# Patient Record
Sex: MALE | Race: WHITE | NOT HISPANIC OR LATINO | Employment: OTHER | URBAN - METROPOLITAN AREA
[De-identification: names, ages, dates, MRNs, and addresses within clinical notes are randomized per-mention and may not be internally consistent; named-entity substitution may affect disease eponyms.]

---

## 2023-01-19 ENCOUNTER — OFFICE VISIT (OUTPATIENT)
Dept: FAMILY MEDICINE CLINIC | Facility: CLINIC | Age: 43
End: 2023-01-19
Payer: MEDICAID

## 2023-01-19 ENCOUNTER — LAB (OUTPATIENT)
Dept: LAB | Facility: HOSPITAL | Age: 43
End: 2023-01-19
Payer: MEDICAID

## 2023-01-19 ENCOUNTER — PATIENT ROUNDING (BHMG ONLY) (OUTPATIENT)
Dept: FAMILY MEDICINE CLINIC | Facility: CLINIC | Age: 43
End: 2023-01-19
Payer: MEDICAID

## 2023-01-19 VITALS
HEIGHT: 73 IN | OXYGEN SATURATION: 98 % | WEIGHT: 284 LBS | HEART RATE: 68 BPM | DIASTOLIC BLOOD PRESSURE: 76 MMHG | SYSTOLIC BLOOD PRESSURE: 126 MMHG | BODY MASS INDEX: 37.64 KG/M2

## 2023-01-19 DIAGNOSIS — Z00.00 PREVENTATIVE HEALTH CARE: ICD-10-CM

## 2023-01-19 DIAGNOSIS — F22 PARANOIA: ICD-10-CM

## 2023-01-19 DIAGNOSIS — Z00.00 PREVENTATIVE HEALTH CARE: Primary | ICD-10-CM

## 2023-01-19 DIAGNOSIS — Z11.59 ENCOUNTER FOR HEPATITIS C SCREENING TEST FOR LOW RISK PATIENT: ICD-10-CM

## 2023-01-19 DIAGNOSIS — K76.0 FATTY LIVER: ICD-10-CM

## 2023-01-19 DIAGNOSIS — E55.9 VITAMIN D DEFICIENCY: ICD-10-CM

## 2023-01-19 DIAGNOSIS — K57.90 DIVERTICULOSIS: ICD-10-CM

## 2023-01-19 LAB
25(OH)D3 SERPL-MCNC: 46.1 NG/ML (ref 30–100)
ALBUMIN SERPL-MCNC: 4.8 G/DL (ref 3.5–5.2)
ALBUMIN/GLOB SERPL: 2.2 G/DL
ALP SERPL-CCNC: 56 U/L (ref 39–117)
ALT SERPL W P-5'-P-CCNC: 26 U/L (ref 1–41)
ANION GAP SERPL CALCULATED.3IONS-SCNC: 8.5 MMOL/L (ref 5–15)
AST SERPL-CCNC: 21 U/L (ref 1–40)
BACTERIA UR QL AUTO: NORMAL /HPF
BASOPHILS # BLD AUTO: 0.04 10*3/MM3 (ref 0–0.2)
BASOPHILS NFR BLD AUTO: 0.6 % (ref 0–1.5)
BILIRUB SERPL-MCNC: 0.2 MG/DL (ref 0–1.2)
BILIRUB UR QL STRIP: NEGATIVE
BUN SERPL-MCNC: 22 MG/DL (ref 6–20)
BUN/CREAT SERPL: 22.7 (ref 7–25)
CALCIUM SPEC-SCNC: 9.3 MG/DL (ref 8.6–10.5)
CHLORIDE SERPL-SCNC: 102 MMOL/L (ref 98–107)
CHOLEST SERPL-MCNC: 193 MG/DL (ref 0–200)
CLARITY UR: CLEAR
CO2 SERPL-SCNC: 25.5 MMOL/L (ref 22–29)
COLOR UR: YELLOW
CREAT SERPL-MCNC: 0.97 MG/DL (ref 0.76–1.27)
DEPRECATED RDW RBC AUTO: 40.5 FL (ref 37–54)
EGFRCR SERPLBLD CKD-EPI 2021: 98.7 ML/MIN/1.73
EOSINOPHIL # BLD AUTO: 0.36 10*3/MM3 (ref 0–0.4)
EOSINOPHIL NFR BLD AUTO: 5.4 % (ref 0.3–6.2)
ERYTHROCYTE [DISTWIDTH] IN BLOOD BY AUTOMATED COUNT: 11.3 % (ref 12.3–15.4)
GLOBULIN UR ELPH-MCNC: 2.2 GM/DL
GLUCOSE SERPL-MCNC: 94 MG/DL (ref 65–99)
GLUCOSE UR STRIP-MCNC: NEGATIVE MG/DL
HBA1C MFR BLD: 5.3 % (ref 4.8–5.6)
HCT VFR BLD AUTO: 38.5 % (ref 37.5–51)
HCV AB SER DONR QL: NORMAL
HDLC SERPL-MCNC: 52 MG/DL (ref 40–60)
HGB BLD-MCNC: 13.6 G/DL (ref 13–17.7)
HGB UR QL STRIP.AUTO: NEGATIVE
HYALINE CASTS UR QL AUTO: NORMAL /LPF
IMM GRANULOCYTES # BLD AUTO: 0.01 10*3/MM3 (ref 0–0.05)
IMM GRANULOCYTES NFR BLD AUTO: 0.2 % (ref 0–0.5)
KETONES UR QL STRIP: NEGATIVE
LDLC SERPL CALC-MCNC: 114 MG/DL (ref 0–100)
LDLC/HDLC SERPL: 2.12 {RATIO}
LEUKOCYTE ESTERASE UR QL STRIP.AUTO: ABNORMAL
LYMPHOCYTES # BLD AUTO: 1.56 10*3/MM3 (ref 0.7–3.1)
LYMPHOCYTES NFR BLD AUTO: 23.6 % (ref 19.6–45.3)
MCH RBC QN AUTO: 34.8 PG (ref 26.6–33)
MCHC RBC AUTO-ENTMCNC: 35.3 G/DL (ref 31.5–35.7)
MCV RBC AUTO: 98.5 FL (ref 79–97)
MONOCYTES # BLD AUTO: 0.88 10*3/MM3 (ref 0.1–0.9)
MONOCYTES NFR BLD AUTO: 13.3 % (ref 5–12)
NEUTROPHILS NFR BLD AUTO: 3.77 10*3/MM3 (ref 1.7–7)
NEUTROPHILS NFR BLD AUTO: 56.9 % (ref 42.7–76)
NITRITE UR QL STRIP: NEGATIVE
NRBC BLD AUTO-RTO: 0 /100 WBC (ref 0–0.2)
PH UR STRIP.AUTO: 6.5 [PH] (ref 5–8)
PLATELET # BLD AUTO: 314 10*3/MM3 (ref 140–450)
PMV BLD AUTO: 9.1 FL (ref 6–12)
POTASSIUM SERPL-SCNC: 4.5 MMOL/L (ref 3.5–5.2)
PROT SERPL-MCNC: 7 G/DL (ref 6–8.5)
PROT UR QL STRIP: NEGATIVE
RBC # BLD AUTO: 3.91 10*6/MM3 (ref 4.14–5.8)
RBC # UR STRIP: NORMAL /HPF
REF LAB TEST METHOD: NORMAL
SODIUM SERPL-SCNC: 136 MMOL/L (ref 136–145)
SP GR UR STRIP: 1.01 (ref 1–1.03)
SQUAMOUS #/AREA URNS HPF: NORMAL /HPF
T4 FREE SERPL-MCNC: 0.9 NG/DL (ref 0.93–1.7)
TRIGL SERPL-MCNC: 155 MG/DL (ref 0–150)
TSH SERPL DL<=0.05 MIU/L-ACNC: 1.02 UIU/ML (ref 0.27–4.2)
UROBILINOGEN UR QL STRIP: ABNORMAL
VLDLC SERPL-MCNC: 27 MG/DL (ref 5–40)
WBC # UR STRIP: NORMAL /HPF
WBC NRBC COR # BLD: 6.62 10*3/MM3 (ref 3.4–10.8)

## 2023-01-19 PROCEDURE — 99386 PREV VISIT NEW AGE 40-64: CPT | Performed by: INTERNAL MEDICINE

## 2023-01-19 PROCEDURE — 81001 URINALYSIS AUTO W/SCOPE: CPT

## 2023-01-19 PROCEDURE — 80061 LIPID PANEL: CPT

## 2023-01-19 PROCEDURE — 83036 HEMOGLOBIN GLYCOSYLATED A1C: CPT

## 2023-01-19 PROCEDURE — 84439 ASSAY OF FREE THYROXINE: CPT

## 2023-01-19 PROCEDURE — 80050 GENERAL HEALTH PANEL: CPT

## 2023-01-19 PROCEDURE — 3008F BODY MASS INDEX DOCD: CPT | Performed by: INTERNAL MEDICINE

## 2023-01-19 PROCEDURE — 86803 HEPATITIS C AB TEST: CPT

## 2023-01-19 PROCEDURE — 82306 VITAMIN D 25 HYDROXY: CPT

## 2023-01-19 RX ORDER — ZIPRASIDONE HYDROCHLORIDE 20 MG/1
20 CAPSULE ORAL 2 TIMES DAILY
COMMUNITY
Start: 2023-01-09

## 2023-01-19 NOTE — PROGRESS NOTES
Chief Complaint   Patient presents with   • Establish Care              HPI:  Wade Savage is a 42 y.o. male who presents today for establish care and annual checkup.    ROS:  Constitutional: no fevers, night sweats or unexplained weight loss  Eyes: no vision changes  ENT: no runny nose, ear pain, sore throat  Cardio: no chest pain, palpitations  Pulm: no shortness of breath, wheezing, or cough  GI: no abdominal pain or changes in bowel movements  : no difficulty urinating  MSK: no difficulty ambulating, no joint pain  Neuro: no weakness, dizziness or headache  Psych: no trouble sleeping  Endo: no change in appetite      Past Medical History:   Diagnosis Date   • Asthma     Childhood asthma   • Diverticulitis    • Fatty liver    • Obesity       Family History   Problem Relation Age of Onset   • Cancer Mother    • Cancer Father       Social History     Socioeconomic History   • Marital status: Single   Tobacco Use   • Smoking status: Former     Packs/day: 0.50     Years: 15.00     Pack years: 7.50     Types: Cigarettes     Start date: 1999     Quit date: 2014     Years since quittin.0   • Smokeless tobacco: Never   • Tobacco comments:     I quit smoking almost 6 years ago.   Substance and Sexual Activity   • Alcohol use: Yes     Alcohol/week: 15.0 standard drinks     Types: 15 Drinks containing 0.5 oz of alcohol per week   • Drug use: Never   • Sexual activity: Not Currently     Partners: Female     Birth control/protection: Condom      No Known Allergies   Immunization History   Administered Date(s) Administered   • COVID-19 (PFIZER) BIVALENT BOOSTER 12+YRS 2022   • COVID-19 (PFIZER) PURPLE CAP 10/13/2021        PE:  Vitals:    23 0940   BP: 126/76   Pulse: 68   SpO2: 98%      Body mass index is 37.47 kg/m².    Gen Appearance: NAD  HEENT: Normocephalic, PERRLA, no thyromegaly, trache midline  Heart: RRR, normal S1 and S2, no murmur  Lungs: CTA b/l, no wheezing, no crackles  Abdomen: Soft,  non-tender, non-distended, no guarding and BSx4  MSK: Moves all extremities well, normal gait, no peripheral edema  Pulses: Palpable and equal b/l  Lymph nodes: No palpable lymphadenopathy   Neuro: No focal deficits      Current Outpatient Medications   Medication Sig Dispense Refill   • ziprasidone (GEODON) 20 MG capsule Take 20 mg by mouth 2 (Two) Times a Day.       No current facility-administered medications for this visit.        Diagnoses and all orders for this visit:    1. Preventative health care (Primary)  -     CBC & Differential; Future  -     Comprehensive Metabolic Panel; Future  -     Hemoglobin A1c; Future  -     Lipid Panel; Future  -     TSH+Free T4; Future  -     Urinalysis With Culture If Indicated - Urine, Clean Catch; Future  Counseled on healthy weight, nutrition, physical activity, cancer screening, and immunizations.    2. Vitamin D deficiency  -     Vitamin D,25-Hydroxy; Future    3. Diverticulosis    4. Fatty liver    5. Encounter for hepatitis C screening test for low risk patient  -     Hepatitis C Antibody; Future    6. Paranoia (HCC)  Stable on geodon. Following with psychiatry.        No follow-ups on file.     Dictated Utilizing Dragon Dictation    Please note that portions of this note were completed with a voice recognition program.    Part of this note may be an electronic transcription/translation of spoken language to printed text using the Dragon Dictation System.

## 2023-01-20 ENCOUNTER — PATIENT MESSAGE (OUTPATIENT)
Dept: FAMILY MEDICINE CLINIC | Facility: CLINIC | Age: 43
End: 2023-01-20
Payer: MEDICAID

## 2023-02-01 ENCOUNTER — PATIENT MESSAGE (OUTPATIENT)
Dept: FAMILY MEDICINE CLINIC | Facility: CLINIC | Age: 43
End: 2023-02-01
Payer: MEDICAID

## 2023-02-02 DIAGNOSIS — G47.33 OSA (OBSTRUCTIVE SLEEP APNEA): Primary | ICD-10-CM

## 2023-02-03 ENCOUNTER — TELEPHONE (OUTPATIENT)
Dept: FAMILY MEDICINE CLINIC | Facility: CLINIC | Age: 43
End: 2023-02-03
Payer: MEDICAID

## 2023-02-03 NOTE — TELEPHONE ENCOUNTER
Caller: Wade Savage    Relationship: Self    Best call back number: [phone]    What orders are you requesting (i.e. lab or imaging): PNEUMONIA VACCINE    In what timeframe would the patient need to come in: NA    Where will you receive your lab/imaging services: AT OFFICE    Additional notes:

## 2023-02-09 NOTE — PROGRESS NOTES
Chief Complaint  Sleep Apnea (NEW PATIENT )    Subjective     History of Present Illness:  Wade Savage is a 42 y.o. male with a history of fatty liver, asthma, and obesity.  He has a history of JOSE and previously was followed at [facility].  Patient goes to bed at 11 PM waking at 7 AM on weekdays, and 12 AM waking at 8 AM on weekends approximately.  Takes him less than 30 minutes to get to sleep.  He does not take naps.  He denies use of tobacco, drinks alcohol 3 or 4 times per week, and denies use of illicit drugs.  Patient drinks regular coffee and regular tea.  He drinks occasional soda. He comes to [facility] care. He had a recall machine and just received a new machine.     Further details are as follows:    [place] Scale is (out of 24): Total score: 4     Estimated average amount of sleep per night: 6.5  Number of times he wakes up at night: 1  Difficulty falling back asleep: no  It usually takes 30 minutes to go to sleep.  He feels sleepy upon waking up: no  Rotating or night shift work: no    Drowsiness/Sleepiness:  He exhibits the following:  Fatigue during the end of the week.     Snoring/Breathing:  He exhibits the following:  loud snoring, snores in all sleep positions and before cpap    Head Injury:  He exhibits the following:  No    Reflux:  He describes the following:  None    Narcolepsy:  He exhibits the following:  none    RLS/PLMs:  He describes the following:  none    Insomnia:  He describes the following:  Not while on cpap    Parasomnia:  He exhibits the following:  Grinds teeth    Weight:       02/10/23  1108   Weight: 127 kg (281 lb)      Weight change in the last year:  gain: 20 lbs    The patient's relevant past medical, surgical, family, and social history reviewed and updated in Epic as appropriate.    Review of Systems   Constitutional: Negative.    HENT: Negative.    Eyes: Negative.    Respiratory: Negative.    Cardiovascular: Negative.    Gastrointestinal: Negative.    Endocrine:  "Negative.    Genitourinary: Negative.    Musculoskeletal: Negative.    Skin: Negative.    Allergic/Immunologic: Negative.    Neurological: Negative.    Hematological: Negative.    Psychiatric/Behavioral: Negative.    All other systems reviewed and are negative.      PMH:    Past Medical History:   Diagnosis Date   • Asthma     Childhood asthma   • Diverticulitis    • Fatty liver    • Obesity      Past Surgical History:   Procedure Laterality Date   • COLONOSCOPY  2013       No Known Allergies    MEDS:  Prior to Admission medications    Medication Sig Start Date End Date Taking? Authorizing Provider   ziprasidone (GEODON) 20 MG capsule Take 20 mg by mouth 2 (Two) Times a Day. 1/9/23   ProviderDel MD       FH:  Family History   Problem Relation Age of Onset   • Cancer Mother    • Cancer Father        Objective   Vital Signs:  /72   Pulse 65   Temp 97.2 °F (36.2 °C) (Infrared)   Ht 185.4 cm (73\")   Wt 127 kg (281 lb)   SpO2 99% Comment: RESTING ROOM AIR  BMI 37.07 kg/m²     Class 2 Severe Obesity (BMI >=35 and <=39.9). Obesity-related health conditions include the following: obstructive sleep apnea. Obesity is improving with lifestyle modifications. BMI is is above average; BMI management plan is completed. We discussed portion control and increasing exercise.  The patient reports that he had been working on weight loss with a goal of retesting for sleep apnea.  He gained some weight back and is now back working on his weight.        Physical Exam  Vitals reviewed.   Constitutional:       Appearance: Normal appearance.   HENT:      Head: Normocephalic and atraumatic.      Nose: Nose normal.      Mouth/Throat:      Mouth: Mucous membranes are moist.   Cardiovascular:      Rate and Rhythm: Normal rate and regular rhythm.      Heart sounds: No murmur heard.    No friction rub. No gallop.   Pulmonary:      Effort: Pulmonary effort is normal. No respiratory distress.      Breath sounds: Normal breath " sounds. No wheezing or rhonchi.   Neurological:      Mental Status: He is alert and oriented to person, place, and time.   Psychiatric:         Behavior: Behavior normal.         Mallampati Score: III (soft and hard palate and base of uvula visible)    Result Review :  The following data was reviewed by: LORNA Nieves on 02/10/2023:    Data reviewed: Compliance report     CPAP compliance report:  AHI: 2.5/H  Days used: 30/30 (100%)  Greater than 4-hour usage: 30/30 (100%)  Average usage (days used): 8 hours 19 minutes  Settings: Auto CPAP-CPAP 10 cm H2O.       Assessment and Plan  Wade Savage is a 42 y.o. male who presents to establish care for JOSE on PAP therapy. He was at [facility] but has changed to [facility].  Patient received a recall device recently, and needs new order for supplies.  I have reviewed his compliance report.  The patient has excellent usage at 100% greater than 4 hours.  AHI is well-controlled at 2.5/H.  I will refill his supplies and he has been instructed to return for follow-up in compliance in 1 year.  I have discussed weight loss as it pertains to obstructive sleep apnea, and the patient is working on weight loss with a goal of retesting for sleep apnea and possibly being able to discontinue PAP therapy.    Diagnoses and all orders for this visit:    1. Obstructive sleep apnea, adult (Primary)  -     PAP Therapy                 I discussed the consequences of uncontrolled sleep apnea including hypertension, heart disease, diabetes, stroke, and dementia. I further discussed sleep apnea therapeutic options including CPAP, Weight loss, Oral dental appliance, and surgery.         Follow Up  Return in about 1 year (around 2/10/2024) for Recheck.  Patient was given instructions and counseling regarding his condition or for health maintenance advice. Please see specific information pulled into the AVS if appropriate.     LORNA Caruso, [facility]  Pulmonology,  Critical Care, and Sleep Medicine

## 2023-02-10 ENCOUNTER — OFFICE VISIT (OUTPATIENT)
Dept: SLEEP MEDICINE | Facility: CLINIC | Age: 43
End: 2023-02-10
Payer: MEDICAID

## 2023-02-10 VITALS
DIASTOLIC BLOOD PRESSURE: 72 MMHG | WEIGHT: 281 LBS | OXYGEN SATURATION: 99 % | HEART RATE: 65 BPM | HEIGHT: 73 IN | SYSTOLIC BLOOD PRESSURE: 140 MMHG | TEMPERATURE: 97.2 F | BODY MASS INDEX: 37.24 KG/M2

## 2023-02-10 DIAGNOSIS — G47.33 OBSTRUCTIVE SLEEP APNEA, ADULT: Primary | ICD-10-CM

## 2023-02-10 PROCEDURE — 99203 OFFICE O/P NEW LOW 30 MIN: CPT | Performed by: NURSE PRACTITIONER

## 2023-03-17 ENCOUNTER — OFFICE VISIT (OUTPATIENT)
Dept: GASTROENTEROLOGY | Facility: CLINIC | Age: 43
End: 2023-03-17
Payer: MEDICAID

## 2023-03-17 VITALS
DIASTOLIC BLOOD PRESSURE: 76 MMHG | OXYGEN SATURATION: 97 % | TEMPERATURE: 97.1 F | HEIGHT: 73 IN | SYSTOLIC BLOOD PRESSURE: 144 MMHG | HEART RATE: 72 BPM | WEIGHT: 281.6 LBS | BODY MASS INDEX: 37.32 KG/M2

## 2023-03-17 DIAGNOSIS — Z12.11 ENCOUNTER FOR SCREENING COLONOSCOPY: ICD-10-CM

## 2023-03-17 DIAGNOSIS — K76.0 NAFLD (NONALCOHOLIC FATTY LIVER DISEASE): Primary | ICD-10-CM

## 2023-03-17 PROCEDURE — 99213 OFFICE O/P EST LOW 20 MIN: CPT | Performed by: NURSE PRACTITIONER

## 2023-03-17 PROCEDURE — 1159F MED LIST DOCD IN RCRD: CPT | Performed by: NURSE PRACTITIONER

## 2023-03-17 PROCEDURE — 1160F RVW MEDS BY RX/DR IN RCRD: CPT | Performed by: NURSE PRACTITIONER

## 2023-03-17 RX ORDER — SILDENAFIL 10 MG/ML
POWDER, FOR SUSPENSION ORAL
COMMUNITY
Start: 2019-12-01

## 2023-03-17 NOTE — PROGRESS NOTES
"     New Patient Consultation     Patient Name: Wade Savage  : 1980   MRN: [id number]     Chief Complaint:    Chief Complaint   Patient presents with   • fatty liver   • Diverticulitis       History of Present Illness: Wade Savage is a 42 y.o. male who is here today for a Gastroenterology Consultation for NAFLD and diverticulosis.    Rusty is here today to establish care with a history of fatty liver and diverticulosis previously followed by [facility].    He last had an ultrasound the liver in  which showed fatty liver changes with improvement from his previous ultrasound a year prior.  He has had his Hepatitis A and B vaccine series. He has had labs recently with pcp. His A1C is at goal, his cholesterol only mildly elevated . He requests a repeat ultrasound.  He has incidentally gained about 25 lbs over the past year.  He mostly eats \"clean\".  Gets lots of dietary hyper and tries to avoid a lot of processed foods.  Gets pretty regular exercise.  He drinks about 2-3 drinks [employer] beverages a night 5 nights a week.  He does drink about 3 cups of coffee a day.  He takes a vit E and D. He also takes fiber daily.    He has normal bms daily.      There is no family history of liver disease.      In the past has been on abx for \"diverticulitis\" about twice a year.  Since being on a probiotic the past 2 years, he has not required an antibiotic.      He has a history of diverticulosis with his last colonoscopy being in 2018 with a 5-year recall  Subjective      Review of Systems:   Review of Systems   Constitutional: Negative for activity change, appetite change, chills, diaphoresis, fatigue, fever, unexpected weight gain and unexpected weight loss.   HENT: Negative for trouble swallowing.    Cardiovascular: Negative for leg swelling.   Gastrointestinal: Negative for abdominal distention, abdominal pain, anal bleeding, blood in stool, constipation, diarrhea, nausea, rectal pain, vomiting, " GERD and indigestion.   Musculoskeletal: Negative for arthralgias and myalgias.   Skin: Negative for color change and pallor.   Allergic/Immunologic: Negative for immunocompromised state.   Neurological: Negative for confusion.   Hematological: Does not bruise/bleed easily.       Past Medical History:   Past Medical History:   Diagnosis Date   • Asthma     Childhood asthma   • Diverticulitis    • Diverticulitis of colon diagnoses about 5 or 6 years ago   • Fatty liver    • Hyperlipidemia Off and one sincde about .   • Obesity        Past Surgical History:   Past Surgical History:   Procedure Laterality Date   • COLONOSCOPY         Family History:   Family History   Problem Relation Age of Onset   • Cancer Mother    • Cancer Father    • Colon polyps Father        Social History:   Social History     Socioeconomic History   • Marital status: Single   Tobacco Use   • Smoking status: Former     Packs/day: 0.50     Years: 15.00     Pack years: 7.50     Types: Cigarettes     Start date: 1999     Quit date: 2014     Years since quittin.2   • Smokeless tobacco: Never   • Tobacco comments:     I quit smoking almost 6 years ago.   Vaping Use   • Vaping Use: Some days   • Substances: Nicotine   • Devices: Disposable   Substance and Sexual Activity   • Alcohol use: Yes     Alcohol/week: 21.0 standard drinks     Types: 6 Cans of beer, 15 Drinks containing 0.5 oz of alcohol per week   • Drug use: Never   • Sexual activity: Yes     Partners: Female     Birth control/protection: Condom     Comment: I also take Sildenefil, not daily.       Alcohol/Tobacco History:   Social History     Substance and Sexual Activity   Alcohol Use Yes   • Alcohol/week: 21.0 standard drinks   • Types: 6 Cans of beer, 15 Drinks containing 0.5 oz of alcohol per week     Social History     Tobacco Use   Smoking Status Former   • Packs/day: 0.50   • Years: 15.00   • Pack years: 7.50   • Types: Cigarettes   • Start date: 1999   • Quit  "date: 2014   • Years since quittin.2   Smokeless Tobacco Never   Tobacco Comments    I quit smoking almost 6 years ago.       Medications:     Current Outpatient Medications:   •  Sildenafil Citrate 10 MG/ML reconstituted suspension, , Disp: , Rfl:   •  ziprasidone (GEODON) 20 MG capsule, Take 1 capsule by mouth 2 (Two) Times a Day., Disp: , Rfl:     Allergies:   No Known Allergies    Objective     Physical Exam:  Vital Signs:   Vitals:    23 1318   BP: 144/76   BP Location: Left arm   Patient Position: Sitting   Cuff Size: Adult   Pulse: 72   Temp: 97.1 °F (36.2 °C)   TempSrc: Temporal   SpO2: 97%   Weight: 128 kg (281 lb 9.6 oz)   Height: 185.4 cm (72.99\")     Body mass index is 37.16 kg/m².     Physical Exam  Vitals and nursing note reviewed.   Constitutional:       General: He is not in acute distress.     Appearance: He is well-developed. He is not diaphoretic.   Eyes:      General: No scleral icterus.     Conjunctiva/sclera: Conjunctivae normal.   Neck:      Thyroid: No thyromegaly.   Cardiovascular:      Rate and Rhythm: Normal rate and regular rhythm.   Pulmonary:      Effort: Pulmonary effort is normal.      Breath sounds: Normal breath sounds.   Abdominal:      General: Bowel sounds are normal. There is no distension.      Palpations: Abdomen is soft. There is no fluid wave, hepatomegaly or splenomegaly.      Tenderness: There is no abdominal tenderness. There is no guarding or rebound.      Hernia: No hernia is present.   Musculoskeletal:      Cervical back: Neck supple.      Right lower leg: No edema.      Left lower leg: No edema.   Skin:     General: Skin is warm and dry.      Capillary Refill: Capillary refill takes 2 to 3 seconds.      Coloration: Skin is not jaundiced or pale.      Findings: No bruising or petechiae.      Nails: There is no clubbing.   Neurological:      Mental Status: He is alert and oriented to person, place, and time.   Psychiatric:         Behavior: Behavior " normal.         Thought Content: Thought content normal.         Judgment: Judgment normal.     Outside records reviewed including labs and ultrasound/office note    Assessment / Plan      Assessment/Plan:   Diagnoses and all orders for this visit:    1. NAFLD (nonalcoholic fatty liver disease) (Primary)  -     US Liver; Future  We will obtain updated liver ultrasound.  Discussed importance of heart healthy diet.  We discussed the benefits of coffee which she has already partaking in.  Avoid excess alcohol.  2. Encounter for screening colonoscopy  -     Ambulatory Referral For Screening Colonoscopy  Due for recall in December.  Continue high-fiber diet       Follow Up:   Return in about 1 year (around 3/17/2024), or if symptoms worsen or fail to improve.    Plan of care reviewed with the patient at the conclusion of today's visit.  Education was provided regarding diagnosis, management, and any prescribed or recommended OTC medications.  Patient verbalized understanding of and agreement with management plan.       LORNA Haynes  [facility]

## 2023-04-13 ENCOUNTER — HOSPITAL ENCOUNTER (OUTPATIENT)
Dept: ULTRASOUND IMAGING | Facility: HOSPITAL | Age: 43
Discharge: HOME | End: 2023-04-13
Admitting: NURSE PRACTITIONER
Payer: MEDICAID

## 2023-04-13 DIAGNOSIS — K76.0 NAFLD (NONALCOHOLIC FATTY LIVER DISEASE): ICD-10-CM

## 2023-04-13 PROCEDURE — 76705 ECHO EXAM OF ABDOMEN: CPT

## 2023-04-14 ENCOUNTER — PATIENT MESSAGE (OUTPATIENT)
Dept: GASTROENTEROLOGY | Facility: CLINIC | Age: 43
End: 2023-04-14
Payer: MEDICAID

## 2023-04-14 NOTE — TELEPHONE ENCOUNTER
From: Wade Savage  To: Nancy Strickland  Sent: 4/14/2023 10:54 AM EDT  Subject: Medical Records from Dr. Ferro's Office    Nancy,    Good morning! I hope you are doing well. I had a scan yesterday to analyze the state of my current fatty liver and the report said that they didn't have another scan to compare it to. I had requested from [facility] for my medical records to be transferred from [facility] so that the scan can be compared.     Can you please obtain those records from Dr. Kelvin Jackson's office and have the radioligist compare it to the previous scan?     If it's easier to call me to discuss, my cell is [phone].     I will await your confirmation.    Thanks,    Rusty

## 2023-04-20 ENCOUNTER — PATIENT MESSAGE (OUTPATIENT)
Dept: FAMILY MEDICINE CLINIC | Facility: CLINIC | Age: 43
End: 2023-04-20
Payer: MEDICAID

## 2023-04-22 ENCOUNTER — APPOINTMENT (OUTPATIENT)
Dept: CT IMAGING | Facility: HOSPITAL | Age: 43
End: 2023-04-22
Payer: MEDICAID

## 2023-04-22 ENCOUNTER — HOSPITAL ENCOUNTER (EMERGENCY)
Facility: HOSPITAL | Age: 43
Discharge: HOME | End: 2023-04-22
Attending: EMERGENCY MEDICINE
Payer: MEDICAID

## 2023-04-22 VITALS
HEART RATE: 76 BPM | SYSTOLIC BLOOD PRESSURE: 152 MMHG | DIASTOLIC BLOOD PRESSURE: 96 MMHG | TEMPERATURE: 98.3 F | OXYGEN SATURATION: 97 % | RESPIRATION RATE: 14 BRPM | HEIGHT: 74 IN | WEIGHT: 277 LBS | BODY MASS INDEX: 35.55 KG/M2

## 2023-04-22 DIAGNOSIS — R10.30 LOWER ABDOMINAL PAIN: ICD-10-CM

## 2023-04-22 DIAGNOSIS — K57.92 DIVERTICULITIS: Primary | ICD-10-CM

## 2023-04-22 LAB
ALBUMIN SERPL-MCNC: 4.7 G/DL (ref 3.5–5.2)
ALBUMIN/GLOB SERPL: 1.9 G/DL
ALP SERPL-CCNC: 73 U/L (ref 39–117)
ALT SERPL W P-5'-P-CCNC: 58 U/L (ref 1–41)
ANION GAP SERPL CALCULATED.3IONS-SCNC: 11 MMOL/L (ref 5–15)
AST SERPL-CCNC: 145 U/L (ref 1–40)
BASOPHILS # BLD AUTO: 0.04 10*3/MM3 (ref 0–0.2)
BASOPHILS NFR BLD AUTO: 0.5 % (ref 0–1.5)
BILIRUB SERPL-MCNC: 0.4 MG/DL (ref 0–1.2)
BUN SERPL-MCNC: 16 MG/DL (ref 6–20)
BUN/CREAT SERPL: 16.5 (ref 7–25)
CALCIUM SPEC-SCNC: 9.4 MG/DL (ref 8.6–10.5)
CHLORIDE SERPL-SCNC: 101 MMOL/L (ref 98–107)
CO2 SERPL-SCNC: 26 MMOL/L (ref 22–29)
CREAT SERPL-MCNC: 0.97 MG/DL (ref 0.76–1.27)
DEPRECATED RDW RBC AUTO: 44.7 FL (ref 37–54)
EGFRCR SERPLBLD CKD-EPI 2021: 98.7 ML/MIN/1.73
EOSINOPHIL # BLD AUTO: 0.24 10*3/MM3 (ref 0–0.4)
EOSINOPHIL NFR BLD AUTO: 2.8 % (ref 0.3–6.2)
ERYTHROCYTE [DISTWIDTH] IN BLOOD BY AUTOMATED COUNT: 12.3 % (ref 12.3–15.4)
GLOBULIN UR ELPH-MCNC: 2.5 GM/DL
GLUCOSE SERPL-MCNC: 103 MG/DL (ref 65–99)
HCT VFR BLD AUTO: 45 % (ref 37.5–51)
HGB BLD-MCNC: 15 G/DL (ref 13–17.7)
HOLD SPECIMEN: NORMAL
IMM GRANULOCYTES # BLD AUTO: 0.02 10*3/MM3 (ref 0–0.05)
IMM GRANULOCYTES NFR BLD AUTO: 0.2 % (ref 0–0.5)
LIPASE SERPL-CCNC: 41 U/L (ref 13–60)
LYMPHOCYTES # BLD AUTO: 1.25 10*3/MM3 (ref 0.7–3.1)
LYMPHOCYTES NFR BLD AUTO: 14.5 % (ref 19.6–45.3)
MCH RBC QN AUTO: 33.1 PG (ref 26.6–33)
MCHC RBC AUTO-ENTMCNC: 33.3 G/DL (ref 31.5–35.7)
MCV RBC AUTO: 99.3 FL (ref 79–97)
MONOCYTES # BLD AUTO: 0.84 10*3/MM3 (ref 0.1–0.9)
MONOCYTES NFR BLD AUTO: 9.7 % (ref 5–12)
NEUTROPHILS NFR BLD AUTO: 6.24 10*3/MM3 (ref 1.7–7)
NEUTROPHILS NFR BLD AUTO: 72.3 % (ref 42.7–76)
NRBC BLD AUTO-RTO: 0 /100 WBC (ref 0–0.2)
PLATELET # BLD AUTO: 318 10*3/MM3 (ref 140–450)
PMV BLD AUTO: 8.8 FL (ref 6–12)
POTASSIUM SERPL-SCNC: 4.7 MMOL/L (ref 3.5–5.2)
PROT SERPL-MCNC: 7.2 G/DL (ref 6–8.5)
RBC # BLD AUTO: 4.53 10*6/MM3 (ref 4.14–5.8)
SODIUM SERPL-SCNC: 138 MMOL/L (ref 136–145)
WBC NRBC COR # BLD: 8.63 10*3/MM3 (ref 3.4–10.8)
WHOLE BLOOD HOLD COAG: NORMAL
WHOLE BLOOD HOLD SPECIMEN: NORMAL

## 2023-04-22 PROCEDURE — 83690 ASSAY OF LIPASE: CPT | Performed by: EMERGENCY MEDICINE

## 2023-04-22 PROCEDURE — 36415 COLL VENOUS BLD VENIPUNCTURE: CPT

## 2023-04-22 PROCEDURE — 85025 COMPLETE CBC W/AUTO DIFF WBC: CPT | Performed by: EMERGENCY MEDICINE

## 2023-04-22 PROCEDURE — 80053 COMPREHEN METABOLIC PANEL: CPT | Performed by: EMERGENCY MEDICINE

## 2023-04-22 PROCEDURE — 74176 CT ABD & PELVIS W/O CONTRAST: CPT

## 2023-04-22 PROCEDURE — 99282 EMERGENCY DEPT VISIT SF MDM: CPT

## 2023-04-22 RX ORDER — SODIUM CHLORIDE 9 MG/ML
10 INJECTION INTRAVENOUS AS NEEDED
Status: DISCONTINUED | OUTPATIENT
Start: 2023-04-22 | End: 2023-04-22 | Stop reason: HOSPADM

## 2023-04-22 RX ORDER — AMOXICILLIN AND CLAVULANATE POTASSIUM 875; 125 MG/1; MG/1
1 TABLET, FILM COATED ORAL 2 TIMES DAILY
Qty: 20 TABLET | Refills: 0 | Status: SHIPPED | OUTPATIENT
Start: 2023-04-22

## 2023-04-22 NOTE — ED PROVIDER NOTES
Subjective   History of Present Illness  42-year-old male presents emergency department today with pain in his right lower quadrant.  States is been going on for several days he has a history of diverticulitis and feels like this may be the beginnings of that as well.  He sees a gastroenterologist at the [facility].  He reports he has had normal stool output has not had any blood in the stool no black tarry stool.  He has had no fevers no chills.  No dysuria frequency urgency or hematuria.  He had previous colonoscopies which have documented diverticulosis but otherwise unremarkable.    History provided by:  Patient   used: No    Abdominal Pain  Pain location:  RLQ  Pain quality: aching and dull    Pain radiates to:  Does not radiate  Pain severity:  Moderate  Onset quality:  Gradual  Timing:  Constant  Progression:  Worsening  Chronicity:  New  Context comment:  History of diverticulitis seems similar  Relieved by:  Nothing  Worsened by:  Movement and palpation  Ineffective treatments:  None tried  Associated symptoms: no belching, no chest pain, no chills, no constipation, no dysuria, no fever, no hematemesis, no hematochezia, no hematuria, no nausea, no shortness of breath and no vomiting    Risk factors: not elderly, no NSAID use and no recent hospitalization        Review of Systems   Constitutional: Negative for chills and fever.   Respiratory: Negative for chest tightness, shortness of breath and wheezing.    Cardiovascular: Negative for chest pain and palpitations.   Gastrointestinal: Positive for abdominal pain. Negative for constipation, hematemesis, hematochezia, nausea and vomiting.   Genitourinary: Negative for dysuria, frequency and hematuria.   Musculoskeletal: Negative for back pain and neck pain.   Skin: Negative for pallor.   Psychiatric/Behavioral: Negative.    All other systems reviewed and are negative.      Past Medical History:   Diagnosis Date   • Asthma      Childhood asthma   • Diverticulitis    • Diverticulitis of colon diagnoses about 5 or 6 years ago   • Fatty liver    • Hyperlipidemia Off and one sincde about .   • Obesity        No Known Allergies    Past Surgical History:   Procedure Laterality Date   • COLONOSCOPY         Family History   Problem Relation Age of Onset   • Cancer Mother    • Cancer Father    • Colon polyps Father        Social History     Socioeconomic History   • Marital status: Single   Tobacco Use   • Smoking status: Former     Packs/day: 0.50     Years: 15.00     Pack years: 7.50     Types: Cigarettes     Start date: 1999     Quit date: 2014     Years since quittin.3   • Smokeless tobacco: Never   • Tobacco comments:     I quit smoking almost 6 years ago.   Vaping Use   • Vaping Use: Some days   • Substances: Nicotine   • Devices: Disposable   Substance and Sexual Activity   • Alcohol use: Yes     Alcohol/week: 21.0 standard drinks     Types: 6 Cans of beer, 15 Drinks containing 0.5 oz of alcohol per week   • Drug use: Never   • Sexual activity: Yes     Partners: Female     Birth control/protection: Condom     Comment: I also take Sildenefil, not daily.           Objective   Physical Exam  Vitals and nursing note reviewed.   Constitutional:       Appearance: He is well-developed.   HENT:      Head: Normocephalic and atraumatic.      Right Ear: External ear normal.      Left Ear: External ear normal.      Nose: Nose normal.   Eyes:      General: No scleral icterus.     Conjunctiva/sclera: Conjunctivae normal.      Pupils: Pupils are equal, round, and reactive to light.   Neck:      Thyroid: No thyromegaly.   Cardiovascular:      Rate and Rhythm: Normal rate and regular rhythm.      Heart sounds: Normal heart sounds.   Pulmonary:      Effort: Pulmonary effort is normal. No respiratory distress.      Breath sounds: Normal breath sounds. No wheezing or rales.   Chest:      Chest wall: No tenderness.   Abdominal:      General:  Bowel sounds are normal. There is no distension.      Palpations: Abdomen is soft.      Tenderness: There is abdominal tenderness in the right lower quadrant and suprapubic area. There is no right CVA tenderness, left CVA tenderness, guarding or rebound. Negative signs include Mcelroy's sign, Rovsing's sign, McBurney's sign, psoas sign and obturator sign.      Hernia: No hernia is present.   Musculoskeletal:         General: Normal range of motion.      Cervical back: Normal range of motion.   Lymphadenopathy:      Cervical: No cervical adenopathy.   Skin:     General: Skin is warm and dry.   Neurological:      Mental Status: He is alert and oriented to person, place, and time.      Cranial Nerves: No cranial nerve deficit.      Coordination: Coordination normal.      Deep Tendon Reflexes: Reflexes are normal and symmetric. Reflexes normal.   Psychiatric:         Behavior: Behavior normal.         Thought Content: Thought content normal.         Judgment: Judgment normal.         Procedures           ED Course                Recent Results (from the past 24 hour(s))   Comprehensive Metabolic Panel    Collection Time: 04/22/23 12:45 PM    Specimen: Blood   Result Value Ref Range    Glucose 103 (H) 65 - 99 mg/dL    BUN 16 6 - 20 mg/dL    Creatinine 0.97 0.76 - 1.27 mg/dL    Sodium 138 136 - 145 mmol/L    Potassium 4.7 3.5 - 5.2 mmol/L    Chloride 101 98 - 107 mmol/L    CO2 26.0 22.0 - 29.0 mmol/L    Calcium 9.4 8.6 - 10.5 mg/dL    Total Protein 7.2 6.0 - 8.5 g/dL    Albumin 4.7 3.5 - 5.2 g/dL    ALT (SGPT) 58 (H) 1 - 41 U/L    AST (SGOT) 145 (H) 1 - 40 U/L    Alkaline Phosphatase 73 39 - 117 U/L    Total Bilirubin 0.4 0.0 - 1.2 mg/dL    Globulin 2.5 gm/dL    A/G Ratio 1.9 g/dL    BUN/Creatinine Ratio 16.5 7.0 - 25.0    Anion Gap 11.0 5.0 - 15.0 mmol/L    eGFR 98.7 >60.0 mL/min/1.73   Lipase    Collection Time: 04/22/23 12:45 PM    Specimen: Blood   Result Value Ref Range    Lipase 41 13 - 60 U/L   Green Top (Gel)     Collection Time: 04/22/23 12:45 PM   Result Value Ref Range    Extra Tube Hold for add-ons.    Lavender Top    Collection Time: 04/22/23 12:45 PM   Result Value Ref Range    Extra Tube hold for add-on    Gold Top - SST    Collection Time: 04/22/23 12:45 PM   Result Value Ref Range    Extra Tube Hold for add-ons.    Gray Top    Collection Time: 04/22/23 12:45 PM   Result Value Ref Range    Extra Tube Hold for add-ons.    Light Blue Top    Collection Time: 04/22/23 12:45 PM   Result Value Ref Range    Extra Tube Hold for add-ons.    CBC Auto Differential    Collection Time: 04/22/23 12:45 PM    Specimen: Blood   Result Value Ref Range    WBC 8.63 3.40 - 10.80 10*3/mm3    RBC 4.53 4.14 - 5.80 10*6/mm3    Hemoglobin 15.0 13.0 - 17.7 g/dL    Hematocrit 45.0 37.5 - 51.0 %    MCV 99.3 (H) 79.0 - 97.0 fL    MCH 33.1 (H) 26.6 - 33.0 pg    MCHC 33.3 31.5 - 35.7 g/dL    RDW 12.3 12.3 - 15.4 %    RDW-SD 44.7 37.0 - 54.0 fl    MPV 8.8 6.0 - 12.0 fL    Platelets 318 140 - 450 10*3/mm3    Neutrophil % 72.3 42.7 - 76.0 %    Lymphocyte % 14.5 (L) 19.6 - 45.3 %    Monocyte % 9.7 5.0 - 12.0 %    Eosinophil % 2.8 0.3 - 6.2 %    Basophil % 0.5 0.0 - 1.5 %    Immature Grans % 0.2 0.0 - 0.5 %    Neutrophils, Absolute 6.24 1.70 - 7.00 10*3/mm3    Lymphocytes, Absolute 1.25 0.70 - 3.10 10*3/mm3    Monocytes, Absolute 0.84 0.10 - 0.90 10*3/mm3    Eosinophils, Absolute 0.24 0.00 - 0.40 10*3/mm3    Basophils, Absolute 0.04 0.00 - 0.20 10*3/mm3    Immature Grans, Absolute 0.02 0.00 - 0.05 10*3/mm3    nRBC 0.0 0.0 - 0.2 /100 WBC     Note: In addition to lab results from this visit, the labs listed above may include labs taken at another facility or during a different encounter within the last 24 hours. Please correlate lab times with ED admission and discharge times for further clarification of the services performed during this visit.    CT Abdomen Pelvis Without Contrast   Final Result   Impression:   1.Sigmoid colon diverticulosis with  "minimal adjacent inflammatory stranding, which may be due to early or mild diverticulitis. No evidence of bowel perforation or abscess.   2.Nonobstructing right nephrolithiasis. Apparent urinary bladder wall thickening may be due to incomplete distention or cystitis.   3.No evidence of appendicitis.                  Electronically Signed: Nancy Finney     4/22/2023 1:28 PM EDT     Workstation ID: [id number]        Vitals:    04/22/23 1125   BP: 152/96   BP Location: Left arm   Patient Position: Sitting   Pulse: 76   Resp: 14   Temp: 98.3 °F (36.8 °C)   TempSrc: Oral   SpO2: 97%   Weight: 126 kg (277 lb)   Height: 186.7 cm (73.5\")     Medications - No data to display  ECG/EMG Results (last 24 hours)     ** No results found for the last 24 hours. **        No orders to display                                  Medical Decision Making  Right lower quadrant pain for several days but seems to be getting slightly worse.  Has a history of diverticulosis fume seems very similar.  CT scan would support this is an early diverticulitis.  Laboratory data was essentially unremarkable    Diverticulitis: acute illness or injury  Lower abdominal pain: acute illness or injury  Amount and/or Complexity of Data Reviewed  Labs: ordered. Decision-making details documented in ED Course.  Radiology: ordered and independent interpretation performed. Decision-making details documented in ED Course.      Risk  Prescription drug management.          Final diagnoses:   Diverticulitis   Lower abdominal pain       ED Disposition  ED Disposition     ED Disposition   Discharge    Condition   Stable    Comment   --             Inocente Cisse, DO  [address] [id number] [phone]               Medication List      New Prescriptions    amoxicillin-clavulanate 875-125 MG per tablet  Commonly known as: AUGMENTIN  Take 1 tablet by mouth 2 (Two) Times a Day.     hyoscyamine 0.125 MG SL tablet  Commonly known as: LEVSIN  Take 1 " tablet by mouth Every 4 (Four) Hours As Needed for Cramping.           Where to Get Your Medications      These medications were sent to [id number] - [place], [place] - [address] AT [facility] [address]  - [phone]  - [phone]   [address], [facility] [address]    Phone: [phone]   · amoxicillin-clavulanate 875-125 MG per tablet  · hyoscyamine 0.125 MG SL tablet          Evgeny Cm PA  04/22/23 6353

## 2023-04-22 NOTE — Clinical Note
[facility] EMERGENCY DEPARTMENT  [address] [facility] [address]  Phone: [phone]    Wade Savage was seen and treated in our emergency department on 4/22/2023.  He may return to work on 04/25/2023.         Thank you for choosing [facility].    Evgeny Cm PA

## 2023-04-22 NOTE — DISCHARGE INSTRUCTIONS
Low fiber diet for 2 weeks then may return to a high-fiber diet.   No cyanosis, no pallor, no jaundice, no rash

## 2023-04-24 ENCOUNTER — TELEPHONE (OUTPATIENT)
Dept: GASTROENTEROLOGY | Facility: CLINIC | Age: 43
End: 2023-04-24
Payer: MEDICAID

## 2023-04-24 NOTE — TELEPHONE ENCOUNTER
I talked to Rusty this afternoon. I explained to patient that I would request his records from Dr Kelvin Jackson at [facility] today. I would contact him once records are received. Patient voiced understanding.

## 2023-04-24 NOTE — TELEPHONE ENCOUNTER
----- Message from Wade Savage sent at 4/20/2023  3:01 PM EDT -----  Regarding: Scan 3 weeks ago   Contact: [phone]  Nancy,    Danny afternoon!  I sent you a message previously on the patient portal and didn't get a reply so I am re-sending my request.    The tech who performed my ultrasound did not have my medical records that were supposed to be transferred from Dr. Kelvin Jackson of [facility].  Since their office mishandled the transfer of the files, or didn't transfer them at all, caused my scan to not be compared to the previous.    Given I have fatty liver, I would like to know how this year's scan looks compared to last year's scan to see if the disease has improved or gotten worse, since I have had weight gain this year.  There is no point in having a scan done at [facility] if there isn't another image from last year from [facility] to compare it to.      Can you please respond to this message in the portal or call me on my cell at [phone] to discuss.    Thanks,    Rusty Savage  Cell: [phone]

## 2023-05-02 ENCOUNTER — TELEPHONE (OUTPATIENT)
Dept: GASTROENTEROLOGY | Facility: CLINIC | Age: 43
End: 2023-05-02
Payer: MEDICAID

## 2023-05-02 ENCOUNTER — HOSPITAL ENCOUNTER (OUTPATIENT)
Dept: CT IMAGING | Facility: HOSPITAL | Age: 43
Discharge: HOME | End: 2023-05-02
Admitting: NURSE PRACTITIONER
Payer: MEDICAID

## 2023-05-02 DIAGNOSIS — K57.92 DIVERTICULITIS: Primary | ICD-10-CM

## 2023-05-02 DIAGNOSIS — K57.92 DIVERTICULITIS: ICD-10-CM

## 2023-05-02 PROCEDURE — 74177 CT ABD & PELVIS W/CONTRAST: CPT

## 2023-05-02 PROCEDURE — 25510000001 IOPAMIDOL 61 % SOLUTION: Performed by: NURSE PRACTITIONER

## 2023-05-02 RX ADMIN — IOPAMIDOL 85 ML: 612 INJECTION, SOLUTION INTRAVENOUS at 18:53

## 2023-05-02 NOTE — TELEPHONE ENCOUNTER
"Stat CT ordered by Nancy Strickland. Scheduled, authorized. Called patient to discuss appointment details.    Patient reports his condition is not 100%, but is seeing improvement \"from where I was yesterday.\"    Current symptoms include mild abdominal discomfort with slight signs of improvement over the past 24 hours. Patient reports did not eat breakfast and has been fasting, reportedly since 10:30PM last night. Patient did have coffee.    Patient is now uncertain if he should have a CT scan for his symptoms and would like advice on what he should do.  "

## 2023-05-02 NOTE — TELEPHONE ENCOUNTER
I SPOKE WITH GABRIEL CASTILLO'S RECOMMENDATION GIVEN. PATIENT CT SCAN APPOINTMENT IS SCHEDULED FOR TODAY AT 5PM.

## 2023-09-28 ENCOUNTER — TELEPHONE (OUTPATIENT)
Dept: GASTROENTEROLOGY | Facility: CLINIC | Age: 43
End: 2023-09-28
Payer: MEDICAID

## 2023-09-28 NOTE — TELEPHONE ENCOUNTER
Provider: YAA DO    Caller: DOMINGA DENNY    Relationship to Patient: SELF        Phone Number: [phone]    Reason for Call: PATIENT CALLED IN AND STATED THAT HE WOULD LIKE TO CANCEL HIS UPCOMING COLONOSCOPY SCHEDULED FOR 12/05/2023 DUE TO HIS INSURANCE CHANGING. PATIENT STATED THAT HE WILL CALL BACK AT A LATER TIME TO RESCHEDULE. PLEASE CANCEL PROCEDURE.

## 2023-10-05 ENCOUNTER — CLINICAL SUPPORT (OUTPATIENT)
Dept: FAMILY MEDICINE CLINIC | Facility: CLINIC | Age: 43
End: 2023-10-05
Payer: MEDICAID

## 2023-10-05 DIAGNOSIS — Z23 IMMUNIZATION DUE: Primary | ICD-10-CM

## 2023-10-05 PROCEDURE — 90677 PCV20 VACCINE IM: CPT | Performed by: INTERNAL MEDICINE

## 2023-10-05 PROCEDURE — 90471 IMMUNIZATION ADMIN: CPT | Performed by: INTERNAL MEDICINE

## 2023-10-10 ENCOUNTER — NURSE TRIAGE (OUTPATIENT)
Dept: CALL CENTER | Facility: HOSPITAL | Age: 43
End: 2023-10-10
Payer: MEDICAID

## 2023-10-10 NOTE — TELEPHONE ENCOUNTER
Reason for Disposition   COVID-19 vaccine, Frequently Asked Questions (FAQs)    Additional Information   Negative: [1] Difficulty breathing or swallowing AND [2] starts within 2 hours after injection   Negative: Sounds like a life-threatening emergency to the triager   Negative: [1] Symptoms of COVID-19 (e.g., cough, fever, SOB, or others) AND [2] within 14 days of EXPOSURE (close contact) with diagnosed or suspected COVID-19 patient   Negative: Typical COVID-19 symptoms (e.g., cough, difficulty breathing, loss of taste or smell, runny nose, sore throat) that are NOT expected from vaccine   Negative: [1] COVID-19 exposure AND [2] no symptoms, or symptoms not typical of COVID-19   Negative: Fever > 104 F (40 C)   Negative: Sounds like a severe, unusual reaction to the triager   Negative: [1] Redness or red streak around the injection site AND [2] started > 48 hours after getting vaccine AND [3] fever   Negative: [1] Fever > 101 F (38.3 C) AND [2] age > 60 years AND [3] started > 48 hours after getting vaccine   Negative: [1] Fever > 100.0 F (37.8 C) AND [2] bedridden (e.g., CVA, chronic illness, recovering from surgery) AND [3] started > 48 hours after getting vaccine   Negative: [1] Fever > 100.0 F (37.8 C) AND [2] diabetes mellitus or weak immune system (e.g., HIV positive, cancer chemo, splenectomy, organ transplant, chronic steroids) AND [3] started > 48 hours after getting vaccine   Negative: [1] Fever > 100.0 F (37.8 C) AND [2] present > 3 days (72 hours)   Negative: [1] Fever > 100.0 F (37.8 C) AND [2] healthcare worker   Negative: [1] Redness around the injection site AND [2] started > 48 hours after getting vaccine AND [3] no fever   (Exception: Red area < 1 inch or 2.5 cm wide.)   Negative: [1] Pain, tenderness, or swelling at the injection site AND [2] over 3 days (72 hours) since vaccine AND [3] getting worse   Negative: [1] Pain, tenderness, or swelling at the injection site AND [2] lasts > 7 days    "Negative: [1] Lymph node swelling (i.e., armpit or neck on side of vaccine) AND [2] lasts > 3 weeks   Negative: [1] Requesting COVID-19 vaccine AND [2] healthcare worker (e.g., EMS first responders, doctors, nurses)   Negative: [1] Requesting COVID-19 vaccine AND [2] resident of a long-term care facility (e.g., nursing home)   Negative: Requesting COVID-19 vaccine   Negative: COVID-19 vaccine, injection site reaction (e.g., pain, redness, swelling), questions about   Negative: COVID-19 vaccine, systemic reactions (e.g., fatigue, fever, muscle aches), questions about    Answer Assessment - Initial Assessment Questions  1. MAIN CONCERN OR SYMPTOM:  \"What is your main concern right now?\" \"What question do you have?\" \"What's the main symptom you're worried about?\" (e.g., fever, pain, redness, swelling)      No symptoms, got Pzifer booster today and got Pneumococcal vaccine last week.   2. VACCINE: \"What vaccination did you receive?\" (e.g., none; AstraZeneca, J&J, Moderna, Pfizer, other)       Pfizer  3. SYMPTOM ONSET: \"When did the  begin?\" (e.g., not relevant; hours, days)       No symptoms  4. SYMPTOM SEVERITY: \"How bad is it?\"       na  5. FEVER: \"Is there a fever?\" If Yes, ask: \"What is it, how was it measured, and when did it start?\"       na  6. PAST REACTIONS: \"Have you reacted to immunizations before?\" If Yes, ask: \"What happened?\"      no  7. OTHER SYMPTOMS: \"Do you have any other symptoms?\" (e.g., fatigue, headache, joint or muscle pain)      no  8. PREGNANCY: \"Is there any chance you are pregnant?\" \"When was your last menstrual period?\"      na    Protocols used: Coronavirus (COVID-19) Vaccine Questions and Reactions-ADULT-AH    "

## 2023-10-10 NOTE — TELEPHONE ENCOUNTER
Vaccination question. 10/10/2023 Caller states he got Pfizer COVID booster today at  and had received the pneumococcal vaccine last week, wanting to check if there is any contraindication to this.    Advised there is no contraindication to have Pfizer covid vaccine spaced after the pneumococcal vaccine.

## 2023-12-21 ENCOUNTER — HOSPITAL ENCOUNTER (EMERGENCY)
Facility: HOSPITAL | Age: 43
Discharge: HOME | End: 2023-12-21
Attending: EMERGENCY MEDICINE
Payer: COMMERCIAL

## 2023-12-21 ENCOUNTER — APPOINTMENT (OUTPATIENT)
Dept: GENERAL RADIOLOGY | Facility: HOSPITAL | Age: 43
End: 2023-12-21
Payer: COMMERCIAL

## 2023-12-21 VITALS
SYSTOLIC BLOOD PRESSURE: 146 MMHG | TEMPERATURE: 98.2 F | OXYGEN SATURATION: 99 % | WEIGHT: 272 LBS | BODY MASS INDEX: 36.05 KG/M2 | RESPIRATION RATE: 20 BRPM | HEART RATE: 76 BPM | DIASTOLIC BLOOD PRESSURE: 84 MMHG | HEIGHT: 73 IN

## 2023-12-21 DIAGNOSIS — J40 BRONCHITIS: Primary | ICD-10-CM

## 2023-12-21 LAB
ALBUMIN SERPL-MCNC: 4.4 G/DL (ref 3.5–5.2)
ALBUMIN/GLOB SERPL: 1.6 G/DL
ALP SERPL-CCNC: 50 U/L (ref 39–117)
ALT SERPL W P-5'-P-CCNC: 28 U/L (ref 1–41)
ANION GAP SERPL CALCULATED.3IONS-SCNC: 12 MMOL/L (ref 5–15)
AST SERPL-CCNC: 26 U/L (ref 1–40)
BASOPHILS # BLD AUTO: 0.06 10*3/MM3 (ref 0–0.2)
BASOPHILS NFR BLD AUTO: 1.1 % (ref 0–1.5)
BILIRUB SERPL-MCNC: 0.4 MG/DL (ref 0–1.2)
BUN SERPL-MCNC: 16 MG/DL (ref 6–20)
BUN/CREAT SERPL: 13.7 (ref 7–25)
CALCIUM SPEC-SCNC: 9.2 MG/DL (ref 8.6–10.5)
CHLORIDE SERPL-SCNC: 102 MMOL/L (ref 98–107)
CO2 SERPL-SCNC: 24 MMOL/L (ref 22–29)
CREAT SERPL-MCNC: 1.17 MG/DL (ref 0.76–1.27)
DEPRECATED RDW RBC AUTO: 43.7 FL (ref 37–54)
EGFRCR SERPLBLD CKD-EPI 2021: 78.3 ML/MIN/1.73
EOSINOPHIL # BLD AUTO: 0.31 10*3/MM3 (ref 0–0.4)
EOSINOPHIL NFR BLD AUTO: 5.8 % (ref 0.3–6.2)
ERYTHROCYTE [DISTWIDTH] IN BLOOD BY AUTOMATED COUNT: 11.9 % (ref 12.3–15.4)
FLUAV RNA RESP QL NAA+PROBE: NOT DETECTED
FLUBV RNA RESP QL NAA+PROBE: NOT DETECTED
GLOBULIN UR ELPH-MCNC: 2.7 GM/DL
GLUCOSE SERPL-MCNC: 112 MG/DL (ref 65–99)
HCT VFR BLD AUTO: 41.1 % (ref 37.5–51)
HGB BLD-MCNC: 14 G/DL (ref 13–17.7)
HOLD SPECIMEN: NORMAL
IMM GRANULOCYTES # BLD AUTO: 0.01 10*3/MM3 (ref 0–0.05)
IMM GRANULOCYTES NFR BLD AUTO: 0.2 % (ref 0–0.5)
LYMPHOCYTES # BLD AUTO: 1.6 10*3/MM3 (ref 0.7–3.1)
LYMPHOCYTES NFR BLD AUTO: 29.7 % (ref 19.6–45.3)
MCH RBC QN AUTO: 34.1 PG (ref 26.6–33)
MCHC RBC AUTO-ENTMCNC: 34.1 G/DL (ref 31.5–35.7)
MCV RBC AUTO: 100 FL (ref 79–97)
MONOCYTES # BLD AUTO: 0.72 10*3/MM3 (ref 0.1–0.9)
MONOCYTES NFR BLD AUTO: 13.4 % (ref 5–12)
NEUTROPHILS NFR BLD AUTO: 2.68 10*3/MM3 (ref 1.7–7)
NEUTROPHILS NFR BLD AUTO: 49.8 % (ref 42.7–76)
NRBC BLD AUTO-RTO: 0 /100 WBC (ref 0–0.2)
NT-PROBNP SERPL-MCNC: <36 PG/ML (ref 0–450)
PLATELET # BLD AUTO: 270 10*3/MM3 (ref 140–450)
PMV BLD AUTO: 8.6 FL (ref 6–12)
POTASSIUM SERPL-SCNC: 3.6 MMOL/L (ref 3.5–5.2)
PROT SERPL-MCNC: 7.1 G/DL (ref 6–8.5)
RBC # BLD AUTO: 4.11 10*6/MM3 (ref 4.14–5.8)
RSV RNA NPH QL NAA+NON-PROBE: NOT DETECTED
SARS-COV-2 RNA RESP QL NAA+PROBE: NOT DETECTED
SODIUM SERPL-SCNC: 138 MMOL/L (ref 136–145)
TROPONIN T SERPL HS-MCNC: <6 NG/L
WBC NRBC COR # BLD AUTO: 5.38 10*3/MM3 (ref 3.4–10.8)
WHOLE BLOOD HOLD COAG: NORMAL
WHOLE BLOOD HOLD SPECIMEN: NORMAL

## 2023-12-21 PROCEDURE — 71045 X-RAY EXAM CHEST 1 VIEW: CPT

## 2023-12-21 PROCEDURE — 84484 ASSAY OF TROPONIN QUANT: CPT | Performed by: EMERGENCY MEDICINE

## 2023-12-21 PROCEDURE — 80053 COMPREHEN METABOLIC PANEL: CPT | Performed by: EMERGENCY MEDICINE

## 2023-12-21 PROCEDURE — 87637 SARSCOV2&INF A&B&RSV AMP PRB: CPT | Performed by: EMERGENCY MEDICINE

## 2023-12-21 PROCEDURE — 93005 ELECTROCARDIOGRAM TRACING: CPT | Performed by: EMERGENCY MEDICINE

## 2023-12-21 PROCEDURE — 83880 ASSAY OF NATRIURETIC PEPTIDE: CPT | Performed by: EMERGENCY MEDICINE

## 2023-12-21 PROCEDURE — 85025 COMPLETE CBC W/AUTO DIFF WBC: CPT | Performed by: EMERGENCY MEDICINE

## 2023-12-21 PROCEDURE — 93005 ELECTROCARDIOGRAM TRACING: CPT

## 2023-12-21 PROCEDURE — 99284 EMERGENCY DEPT VISIT MOD MDM: CPT

## 2023-12-21 RX ORDER — METHYLPREDNISOLONE 4 MG/1
TABLET ORAL
Qty: 21 TABLET | Refills: 0 | Status: SHIPPED | OUTPATIENT
Start: 2023-12-21

## 2023-12-21 RX ORDER — ALBUTEROL SULFATE 90 UG/1
2 AEROSOL, METERED RESPIRATORY (INHALATION) EVERY 4 HOURS PRN
Qty: 8 G | Refills: 0 | Status: SHIPPED | OUTPATIENT
Start: 2023-12-21 | End: 2024-01-20

## 2023-12-21 RX ORDER — SODIUM CHLORIDE 0.9 % (FLUSH) 0.9 %
10 SYRINGE (ML) INJECTION AS NEEDED
Status: DISCONTINUED | OUTPATIENT
Start: 2023-12-21 | End: 2023-12-21 | Stop reason: HOSPADM

## 2023-12-21 NOTE — ED PROVIDER NOTES
Subjective   History of Present Illness  Is a 43-year-old male presented to the emergency department with some cough and shortness of breath.  The patient has longstanding history of asthma and dyslipidemia.  Patient states that he has been dealing with a cold for the last week or so.  He had some nasal congestion nonproductive cough.  He states that over the last 2 days he is felt more short of breath.  Is been very anxious about this.  Is not having any productive nature of the cough.  No hemoptysis.  Denies any fevers or chills.  No headache or change in vision.  No focal weakness.  No chest pain.  No abdominal pain or vomiting.    History provided by:  Patient   used: No        Review of Systems   Constitutional:  Negative for chills and fever.   HENT:  Negative for congestion, ear pain and sore throat.    Eyes:  Negative for visual disturbance.   Respiratory:  Positive for cough and shortness of breath.    Cardiovascular:  Negative for chest pain.   Gastrointestinal:  Negative for abdominal pain.   Genitourinary:  Negative for difficulty urinating.   Musculoskeletal:  Negative for arthralgias.   Skin:  Negative for rash.   Neurological:  Negative for dizziness, weakness and numbness.   Psychiatric/Behavioral:  Negative for agitation.        Past Medical History:   Diagnosis Date    Asthma     Childhood asthma    Diverticulitis     Diverticulitis of colon diagnoses about 5 or 6 years ago    Fatty liver     Hyperlipidemia Off and one sincde about 2004.    Obesity        No Known Allergies    Past Surgical History:   Procedure Laterality Date    COLONOSCOPY  2013       Family History   Problem Relation Age of Onset    Cancer Mother     Cancer Father     Colon polyps Father        Social History     Socioeconomic History    Marital status: Single   Tobacco Use    Smoking status: Former     Packs/day: 0.50     Years: 15.00     Additional pack years: 0.00     Total pack years: 7.50     Types:  Cigarettes     Start date: 1999     Quit date: 2014     Years since quittin.9    Smokeless tobacco: Never    Tobacco comments:     I quit smoking almost 6 years ago.   Vaping Use    Vaping Use: Some days    Substances: Nicotine    Devices: Disposable   Substance and Sexual Activity    Alcohol use: Yes     Alcohol/week: 21.0 standard drinks of alcohol     Types: 6 Cans of beer, 15 Drinks containing 0.5 oz of alcohol per week    Drug use: Never    Sexual activity: Yes     Partners: Female     Birth control/protection: Condom     Comment: I also take Sildenefil, not daily.           Objective   Physical Exam  Vitals and nursing note reviewed.   Constitutional:       General: He is not in acute distress.     Appearance: He is not ill-appearing or toxic-appearing.   HENT:      Mouth/Throat:      Pharynx: No posterior oropharyngeal erythema.   Eyes:      Extraocular Movements: Extraocular movements intact.      Pupils: Pupils are equal, round, and reactive to light.   Cardiovascular:      Rate and Rhythm: Normal rate and regular rhythm.   Pulmonary:      Effort: Pulmonary effort is normal. No respiratory distress.   Abdominal:      General: Abdomen is flat. There is no distension.      Palpations: There is no mass.      Tenderness: There is no abdominal tenderness. There is no guarding or rebound.   Musculoskeletal:         General: No deformity. Normal range of motion.   Neurological:      General: No focal deficit present.      Mental Status: He is alert.      Sensory: No sensory deficit.      Motor: No weakness.         ECG 12 Lead      Date/Time: 2023 5:15 PM    Performed by: Ezra Calderon MD  Authorized by: Ezra Calderon MD  Interpreted by physician  Previous ECG: no previous ECG available  Rhythm: sinus rhythm  Rate: normal  BPM: 84  QRS axis: normal  Conduction: conduction normal  ST Segments: ST segments normal  Other: no other findings  Clinical impression: normal ECG  Comments:  EKG was directly visualized by myself, interpretations as documented in hospital course.               ED Course  ED Course as of 12/21/23 1849   Thu Dec 21, 2023   1717 BP: 146/84 [JK]   1717 Temp: 98.2 °F (36.8 °C) [JK]   1717 Temp src: Oral [JK]   1717 Heart Rate: 76 [JK]   1717 Resp: 20 [JK]   1717 SpO2: 99 %  Patient's repeat vitals, telemetry tracing, and pulse oximetry tracing were directly viewed and interpreted by myself.  Patient is hemodynamically stable [JK]   1847 CBC & Differential(!) [JK]   1847 Single High Sensitivity Troponin T [JK]   1847 BNP [JK]   1847 Comprehensive Metabolic Panel(!) [JK]   1847 COVID-19, FLU A/B, RSV PCR 1 HR TAT - Swab, Nasopharynx  Laboratory studies were reviewed and interpreted directly by myself.  CBC was unremarkable, troponin normal, CMP normal, BNP normal, respiratory swab was normal [JK]   1847 XR Chest 1 View  Imaging was directly visualized by myself, per my interpretations, chest x-ray consistent with bronchitis. [JK]   1847 On reevaluation, the patient is feeling much better. Nontoxic appearance. Patient tolerating oral intake. Repeat examination did not show any significant abnormalities.  Patient does not have any evidence of respiratory distress, hypoxia or impending respiratory failure.  Patient is to followup with PCP in 48 hours. They are to return if they are having any change in symptoms or worsening symptoms. Verbalized understanding. [JK]   1847 I had a discussion with the patient/family regarding diagnosis, diagnostic results, treatment plan, and medications. The patient/family indicated understanding of these instructions. I spent adequate time at the bedside prior to discharge necessary to discuss the aftercare instructions, giving patient education, providing explanations of the results of our evaluations/findings, and my decision making to assure that the patient/family understand the plan of care. Time was allotted to answer questions at that time  and throughout the ED course. Patient is required to maintain timely follow up, as discussed. I also discussed the potential for the development of an acute emergent condition requiring further evaluation, return to the ER, admission, or even surgical intervention.  I encouraged the patient to return to the emergency department immediately for any concerns, worsening symptoms, new complaints, or if symptoms persist and they are unable to seek follow-up in a timely fashion. The patient/family expressed understanding and agreement with this plan    Shared decision making:   After full review of the patient's clinical presentation, review of any work-up including but not limited to laboratory studies and radiology obtained, I had a discussion with the patient.  Treatment options were discussed as well as the risks, benefits and consequences.  I discussed all findings with the patient and family members if available.  During the discussion, treatment goals were understood by all as well as any misconceptions which were addressed with the patient.  Ample time was given for any questions they may have had.  They are in agreement with the treatment plan as well as final disposition. [JK]      ED Course User Index  [JK] Ezra Calderon MD                                             Medical Decision Making  This is a 43-year-old male presented to the emergency department with some cough shortness of breath.  The patient symptoms overall seem consistent with URI, possibly bronchitis or pneumonia.  Patient has a normal lung exam at this time.  There is no evidence of hypoxia or respiratory distress.  Overall the patient is nontoxic-appearing.  Hemodynamically stable.  Initial EKG is normal.  IV access will be established and the patient.  Placed on continuous telemetry and pulse oximetry monitoring.  Workup initiated.      Differential diagnosis: URI, bronchitis, COVID, influenza, sinusitis, viral syndrome, RSV,  pneumonia      Amount and/or Complexity of Data Reviewed  External Data Reviewed: labs, radiology and notes.     Details: External laboratories, imaging as well as notes were reviewed personally by myself.  All relevant studies were used to guide decision making.     Date of previous record: 3/17/2023    Source of note: Gastroenterology    Summary: Patient seen evaluated for diverticulitis.  Did review basic laboratory studies on file including comprehensive metabolic panel as well as liver panel.  Patient is CT abdomen pelvis on file which I did review.  Records reviewed    Labs: ordered. Decision-making details documented in ED Course.  Radiology: ordered and independent interpretation performed. Decision-making details documented in ED Course.  ECG/medicine tests: ordered and independent interpretation performed. Decision-making details documented in ED Course.    Risk  Prescription drug management.        Final diagnoses:   Bronchitis       ED Disposition  ED Disposition       ED Disposition   Discharge    Condition   Stable    Comment   --               Inocente Cisse DO  [address] [id number] [phone]    Call in 1 day           Medication List        New Prescriptions      albuterol sulfate  (90 Base) MCG/ACT inhaler  Commonly known as: PROVENTIL HFA;VENTOLIN HFA;PROAIR HFA  Inhale 2 puffs Every 4 (Four) Hours As Needed for Wheezing for up to 30 days.     methylPREDNISolone 4 MG dose pack  Commonly known as: MEDROL  Take as directed on package instructions.               Where to Get Your Medications        These medications were sent to [facility]/pharmacy #[id number] - [place], [place] - [address]  - [phone]  - [phone] FX  [address] [facility] [address]      Hours: 24-hours Phone: [phone]   albuterol sulfate  (90 Base) MCG/ACT inhaler  methylPREDNISolone 4 MG dose pack            Ezra Calderon MD  12/21/23 7397

## 2023-12-23 LAB
QT INTERVAL: 406 MS
QTC INTERVAL: 479 MS

## 2024-04-29 ENCOUNTER — OFFICE VISIT (OUTPATIENT)
Dept: FAMILY MEDICINE CLINIC | Facility: CLINIC | Age: 44
End: 2024-04-29
Payer: COMMERCIAL

## 2024-04-29 ENCOUNTER — HOSPITAL ENCOUNTER (OUTPATIENT)
Dept: RADIOLOGY | Facility: CLINIC | Age: 44
Discharge: HOME | End: 2024-04-29
Payer: COMMERCIAL

## 2024-04-29 VITALS
OXYGEN SATURATION: 98 % | SYSTOLIC BLOOD PRESSURE: 124 MMHG | BODY MASS INDEX: 36.31 KG/M2 | HEIGHT: 73 IN | DIASTOLIC BLOOD PRESSURE: 80 MMHG | WEIGHT: 274 LBS | HEART RATE: 80 BPM

## 2024-04-29 DIAGNOSIS — S60.10XA SUBUNGUAL HEMATOMA OF FINGER OF RIGHT HAND, INITIAL ENCOUNTER: Primary | ICD-10-CM

## 2024-04-29 PROCEDURE — 99214 OFFICE O/P EST MOD 30 MIN: CPT

## 2024-04-29 NOTE — PROGRESS NOTES
Office Note     Name: Wade Savage    : 1980     MRN: [id number]     Chief Complaint  Finger Injury (Right middle finger was opening a storm window and it was slammed down on finger. No pain just looks bruised. Recently just finished a doxy abx that he had left over from a past illness)    Subjective     History of Present Illness:  Wade Savage is a 43 y.o. male who presents today for  bruise to right middle finger.  Patient states last week he was opening up the windows in his house and they are old so it fell down and slammed on his other hand.  He states almost immediately he developed a bruise/blood underneath the finger.  He states it was painful at first, but since then has not been painful.  States he has been able to use his hand normally, denies numbness or tingling.  Patient states he wanted to be evaluated.  He is up-to-date on tetanus booster.  There are is no laceration or open wound.     He did complete a recent antibiotic for bronchitis and states that his cough is less productive, but that he still has a nagging cough that comes and goes.  He states he has been spending a lot of time in his dad's old car that his dad smoked and for years.  He feels like the cough is worse when he is in the air.  He is unsure whether this is causing his symptoms be persistent or the change in weather.  Denies any fevers, chills.  States is mostly nonproductive now.  Denies chest pain or shortness of breath.  Denies wheezing.        Review of Systems:   Review of Systems   Constitutional:  Negative for chills, fatigue and fever.   HENT:  Negative for congestion.    Respiratory:  Positive for cough. Negative for chest tightness, shortness of breath and wheezing.    Cardiovascular:  Negative for chest pain and palpitations.   Musculoskeletal:  Negative for arthralgias.   Skin:  Positive for bruise.   Neurological:  Negative for syncope, weakness and numbness.       Past Medical History:   Past Medical  History:   Diagnosis Date   • Asthma     Childhood asthma   • Diverticulitis    • Diverticulitis of colon diagnoses about 5 or 6 years ago   • Fatty liver    • Hyperlipidemia Off and one sincde about 2004.   • Obesity        Past Surgical History:   Past Surgical History:   Procedure Laterality Date   • COLONOSCOPY  2013       Family History:   Family History   Problem Relation Age of Onset   • Cancer Mother    • Cancer Father    • Colon polyps Father        Social History:   Social History     Socioeconomic History   • Marital status: Single   Tobacco Use   • Smoking status: Former     Current packs/day: 0.00     Average packs/day: 0.5 packs/day for 15.0 years (7.5 ttl pk-yrs)     Types: Cigarettes     Start date: 1/1/1999     Quit date: 1/1/2014     Years since quitting: 10.3   • Smokeless tobacco: Never   • Tobacco comments:     I quit smoking almost 6 years ago.   Vaping Use   • Vaping status: Some Days   • Substances: Nicotine   • Devices: Disposable   Substance and Sexual Activity   • Alcohol use: Yes     Alcohol/week: 21.0 standard drinks of alcohol     Types: 6 Cans of beer, 15 Drinks containing 0.5 oz of alcohol per week   • Drug use: Never   • Sexual activity: Yes     Partners: Female     Birth control/protection: Condom     Comment: I also take Sildenefil, not daily.       Immunizations:   Immunization History   Administered Date(s) Administered   • COVID-19 (PFIZER) BIVALENT 12+YRS 09/29/2022   • COVID-19 (PFIZER) Purple Cap Monovalent 03/03/2021, 03/26/2021, 09/06/2021, 10/13/2021   • COVID-19 F23 (PFIZER) 12YRS+ (COMIRNATY) 10/10/2023   • Flu Vaccine Split Quad 10/16/2014   • Fluzone (or Fluarix & Flulaval for VFC) >6mos 10/13/2016, 09/26/2017, 09/24/2019, 10/05/2020, 09/06/2023   • Fluzone Quad >6mos (Multi-dose) 10/01/2018, 09/30/2020, 10/01/2020, 09/20/2021, 09/01/2022   • Hepatitis A 03/21/2019, 09/24/2019   • Influenza Seasonal Injectable 09/16/2021   • Influenza, Unspecified 10/16/2014,  "10/13/2016, 09/26/2017, 09/24/2019, 10/05/2020, 09/16/2021   • Pneumococcal Conjugate 20-Valent (PCV20) 10/05/2023   • Tdap 03/25/2010, 04/06/2022        Medications:     Current Outpatient Medications:   •  Retin-A 0.025 % cream, , Disp: , Rfl:   •  Sildenafil Citrate 10 MG/ML reconstituted suspension, , Disp: , Rfl:   •  ziprasidone (GEODON) 20 MG capsule, Take 1 capsule by mouth 2 (Two) Times a Day., Disp: , Rfl:   •  amoxicillin-clavulanate (AUGMENTIN) 875-125 MG per tablet, Take 1 tablet by mouth 2 (Two) Times a Day., Disp: 20 tablet, Rfl: 0  •  hyoscyamine (LEVSIN) 0.125 MG SL tablet, Take 1 tablet by mouth Every 4 (Four) Hours As Needed for Cramping., Disp: 24 tablet, Rfl: 0  •  methylPREDNISolone (MEDROL) 4 MG dose pack, Take as directed on package instructions. (Patient not taking: Reported on 4/29/2024), Disp: 21 tablet, Rfl: 0    Allergies:   No Known Allergies    Objective     Vital Signs  /80   Pulse 80   Ht 185.4 cm (73\")   Wt 124 kg (274 lb)   SpO2 98%   BMI 36.15 kg/m²   Estimated body mass index is 36.15 kg/m² as calculated from the following:    Height as of this encounter: 185.4 cm (73\").    Weight as of this encounter: 124 kg (274 lb).           Physical Exam  Vitals reviewed.   Constitutional:       Appearance: Normal appearance.   HENT:      Head: Normocephalic and atraumatic.   Eyes:      Pupils: Pupils are equal, round, and reactive to light.   Cardiovascular:      Rate and Rhythm: Normal rate and regular rhythm.      Pulses: Normal pulses.      Heart sounds: Normal heart sounds.   Pulmonary:      Effort: Pulmonary effort is normal.      Breath sounds: Normal breath sounds.   Abdominal:      General: Abdomen is flat. Bowel sounds are normal.   Musculoskeletal:      Comments: Bruising from nailbed up one quarter of the nail of right middle finger.  No open lacerations.  No erythema.  No tenderness to palpation of DIP.  Normal range of motion of DIP.   Skin:     General: Skin is " warm.   Neurological:      General: No focal deficit present.      Mental Status: He is alert and oriented to person, place, and time.   Psychiatric:         Mood and Affect: Mood normal.            Results:  No results found for this or any previous visit (from the past 24 hour(s)).     Assessment and Plan     Assessment/Plan:  Diagnoses and all orders for this visit:    1. Subungual hematoma of finger of right hand, initial encounter (Primary)  -     XR Hand 3+ View Right (In Office)  -     Ambulatory Referral to Hand Surgery    With the crushing mechanism to finger would like patient to have x-ray done today even without pain.  Recommended hand surgery referral since the subungual hematoma is down near the nailbed and due to the type of injury that it was.  He verbalized understanding and agreed to this plan.    As for cough, I believe it is likely just lingering from his recent bronchitis and likely being triggered by environmental factors.  His lungs sound good and his oxygenation is good today.  Patient can try an allergy medicine as the pollen count has been high recently.  Also recommend avoiding triggers that include secondhand smoke.  If your symptoms worsen or do not improve please return to clinic for reevaluation.  He verbalized understanding and agreed to this plan.    Follow Up  Return if symptoms worsen or fail to improve.    Marlin Menchaca PA-C   [facility]

## 2024-05-09 ENCOUNTER — OFFICE VISIT (OUTPATIENT)
Dept: ORTHOPEDIC SURGERY | Facility: CLINIC | Age: 44
End: 2024-05-09
Payer: COMMERCIAL

## 2024-05-09 VITALS
SYSTOLIC BLOOD PRESSURE: 136 MMHG | HEIGHT: 73 IN | BODY MASS INDEX: 36.23 KG/M2 | WEIGHT: 273.37 LBS | DIASTOLIC BLOOD PRESSURE: 82 MMHG

## 2024-05-09 DIAGNOSIS — S60.10XA SUBUNGUAL HEMATOMA OF DIGIT OF HAND, INITIAL ENCOUNTER: Primary | ICD-10-CM

## 2024-05-15 ENCOUNTER — OFFICE VISIT (OUTPATIENT)
Dept: FAMILY MEDICINE CLINIC | Facility: CLINIC | Age: 44
End: 2024-05-15
Payer: COMMERCIAL

## 2024-05-15 VITALS
WEIGHT: 267 LBS | BODY MASS INDEX: 35.39 KG/M2 | HEART RATE: 80 BPM | DIASTOLIC BLOOD PRESSURE: 96 MMHG | HEIGHT: 73 IN | OXYGEN SATURATION: 99 % | SYSTOLIC BLOOD PRESSURE: 134 MMHG

## 2024-05-15 DIAGNOSIS — L98.9 SKIN LESION OF FOOT: Primary | ICD-10-CM

## 2024-05-15 PROCEDURE — 99214 OFFICE O/P EST MOD 30 MIN: CPT

## 2024-05-15 NOTE — PROGRESS NOTES
Office Note     Name: Wade Savage    : 1980     MRN: [id number]     Chief Complaint  Mass (Right big toe, )    Subjective     History of Present Illness:  Wade Savage is a 43 y.o. male who presents today for new skin lesion on right great toe.  Patient states that about a week ago he noticed a small black spot to the medial aspect of his big toe on the right foot.  Patient states he was googling and thought that maybe it could be petechiae.  He states that was not there before about 1 week ago.  He denies any recent injuries.  He states that he does wear boots frequently and is on his feet a lot.  Denies pain, swelling.  Denies fevers or chills.  Denies diaphoresis, unexpected weight loss.  Denies history of skin cancers or melanoma.    Review of Systems:   Review of Systems   Constitutional:  Negative for chills and fever.   Respiratory:  Negative for cough.    Cardiovascular:  Negative for chest pain.   Musculoskeletal:  Negative for arthralgias, gait problem and joint swelling.   Skin:  Positive for skin lesions.   Neurological:  Negative for dizziness and numbness.       Past Medical History:   Past Medical History:   Diagnosis Date   • Asthma     Childhood asthma   • Diverticulitis    • Diverticulitis of colon diagnoses about 5 or 6 years ago   • Fatty liver    • Hyperlipidemia Off and one sincde about .   • Obesity        Past Surgical History:   Past Surgical History:   Procedure Laterality Date   • COLONOSCOPY         Family History:   Family History   Problem Relation Age of Onset   • Cancer Mother    • Cancer Father    • Colon polyps Father        Social History:   Social History     Socioeconomic History   • Marital status: Single   Tobacco Use   • Smoking status: Former     Current packs/day: 0.00     Average packs/day: 0.5 packs/day for 15.0 years (7.5 ttl pk-yrs)     Types: Cigarettes     Start date: 1999     Quit date: 2014     Years since quitting: 10.3   • Smokeless  "tobacco: Never   • Tobacco comments:     I quit smoking almost 6 years ago.   Vaping Use   • Vaping status: Some Days   • Substances: Nicotine   • Devices: Disposable   Substance and Sexual Activity   • Alcohol use: Yes     Alcohol/week: 21.0 standard drinks of alcohol     Types: 6 Cans of beer, 15 Drinks containing 0.5 oz of alcohol per week   • Drug use: Never   • Sexual activity: Yes     Partners: Female     Birth control/protection: Condom     Comment: I also take Sildenefil, not daily.       Immunizations:   Immunization History   Administered Date(s) Administered   • COVID-19 (PFIZER) BIVALENT 12+YRS 09/29/2022   • COVID-19 (PFIZER) Purple Cap Monovalent 03/03/2021, 03/26/2021, 09/06/2021, 10/13/2021   • COVID-19 F23 (PFIZER) 12YRS+ (COMIRNATY) 10/10/2023   • Flu Vaccine Split Quad 10/16/2014   • Fluzone (or Fluarix & Flulaval for VFC) >6mos 10/13/2016, 09/26/2017, 09/24/2019, 10/05/2020, 09/06/2023   • Fluzone Quad >6mos (Multi-dose) 10/01/2018, 09/30/2020, 10/01/2020, 09/20/2021, 09/01/2022   • Hepatitis A 03/21/2019, 09/24/2019   • Influenza Seasonal Injectable 09/16/2021   • Influenza, Unspecified 10/16/2014, 10/13/2016, 09/26/2017, 09/24/2019, 10/05/2020, 09/16/2021   • Pneumococcal Conjugate 20-Valent (PCV20) 10/05/2023   • Tdap 03/25/2010, 04/06/2022        Medications:     Current Outpatient Medications:   •  hyoscyamine (LEVSIN) 0.125 MG SL tablet, Take 1 tablet by mouth Every 4 (Four) Hours As Needed for Cramping., Disp: 24 tablet, Rfl: 0  •  Retin-A 0.025 % cream, , Disp: , Rfl:   •  Sildenafil Citrate 10 MG/ML reconstituted suspension, , Disp: , Rfl:   •  ziprasidone (GEODON) 20 MG capsule, Take 1 capsule by mouth 2 (Two) Times a Day., Disp: , Rfl:     Allergies:   No Known Allergies    Objective     Vital Signs  /96   Pulse 80   Ht 185.4 cm (72.99\")   Wt 121 kg (267 lb)   SpO2 99%   BMI 35.24 kg/m²   Estimated body mass index is 35.24 kg/m² as calculated from the following:    Height as " "of this encounter: 185.4 cm (72.99\").    Weight as of this encounter: 121 kg (267 lb).      Physical Exam  Vitals reviewed.   Constitutional:       General: He is not in acute distress.     Appearance: Normal appearance.   HENT:      Head: Normocephalic and atraumatic.      Nose: Nose normal.   Eyes:      Pupils: Pupils are equal, round, and reactive to light.   Cardiovascular:      Rate and Rhythm: Normal rate and regular rhythm.      Pulses: Normal pulses.      Heart sounds: Normal heart sounds.   Pulmonary:      Effort: Pulmonary effort is normal.   Musculoskeletal:         General: No swelling.   Skin:     General: Skin is warm.      Findings: No erythema.      Comments: Small, black lesion with irregular borders to right great toe.  Slightly raised.   Neurological:      General: No focal deficit present.      Mental Status: He is alert and oriented to person, place, and time.   Psychiatric:         Mood and Affect: Mood normal.          Results:  No results found for this or any previous visit (from the past 24 hour(s)).     Assessment and Plan     Assessment/Plan:  Diagnoses and all orders for this visit:    1. Skin lesion of foot (Primary)  -     Cancel: Ambulatory Referral to Dermatology    Where the lesion is slightly raised I would like to refer him to dermatology for further evaluation and possible biopsy.  Cannot rule out a skin cancer by appearance.  Advised him to watch over the next couple of weeks and notify me of any changes.  He verbalized understanding and agreed to this plan.    Follow Up  No follow-ups on file.    Marlin Menchaca PA-C   [facility]  "

## 2024-05-22 ENCOUNTER — NURSE TRIAGE (OUTPATIENT)
Dept: CALL CENTER | Facility: HOSPITAL | Age: 44
End: 2024-05-22
Payer: COMMERCIAL

## 2024-05-22 ENCOUNTER — HOSPITAL ENCOUNTER (EMERGENCY)
Facility: HOSPITAL | Age: 44
Discharge: HOME | End: 2024-05-22
Attending: STUDENT IN AN ORGANIZED HEALTH CARE EDUCATION/TRAINING PROGRAM
Payer: COMMERCIAL

## 2024-05-22 ENCOUNTER — APPOINTMENT (OUTPATIENT)
Dept: GENERAL RADIOLOGY | Facility: HOSPITAL | Age: 44
End: 2024-05-22
Payer: COMMERCIAL

## 2024-05-22 VITALS
TEMPERATURE: 98.4 F | HEIGHT: 73 IN | WEIGHT: 260 LBS | RESPIRATION RATE: 18 BRPM | SYSTOLIC BLOOD PRESSURE: 168 MMHG | BODY MASS INDEX: 34.46 KG/M2 | HEART RATE: 80 BPM | OXYGEN SATURATION: 100 % | DIASTOLIC BLOOD PRESSURE: 95 MMHG

## 2024-05-22 DIAGNOSIS — Z48.89 ENCOUNTER FOR POST SURGICAL WOUND CHECK: Primary | ICD-10-CM

## 2024-05-22 DIAGNOSIS — R05.3 CHRONIC COUGH: ICD-10-CM

## 2024-05-22 PROCEDURE — 71045 X-RAY EXAM CHEST 1 VIEW: CPT

## 2024-05-22 PROCEDURE — 99283 EMERGENCY DEPT VISIT LOW MDM: CPT

## 2024-05-22 RX ORDER — IBUPROFEN 800 MG/1
800 TABLET ORAL ONCE
Status: COMPLETED | OUTPATIENT
Start: 2024-05-22 | End: 2024-05-22

## 2024-05-22 RX ADMIN — IBUPROFEN 800 MG: 800 TABLET, FILM COATED ORAL at 18:16

## 2024-05-22 NOTE — ED PROVIDER NOTES
Subjective   History of Present Illness patient is a 43-year-old male presents to the emergency department complaining of right lower extremity numbness, after he had a shave biopsy performed at a dermatology office on the right great toe MTP joint with localized lidocaine applied this afternoon.  Patient is concerned that he may have a blood clot in his leg due to the procedure.  Patient also complains of a chronic cough greater than 8 weeks, and was told he had bronchitis, is a daily user of cannabis from a electronic cigarette.    Review of Systems   Constitutional: Negative.    HENT: Negative.     Respiratory: Negative.     Cardiovascular: Negative.    Gastrointestinal: Negative.    Genitourinary: Negative.    Musculoskeletal: Negative.    Skin:  Positive for wound.   Neurological: Negative.    Hematological: Negative.        Past Medical History:   Diagnosis Date    Asthma     Childhood asthma    Diverticulitis     Diverticulitis of colon diagnoses about 5 or 6 years ago    Fatty liver     Hyperlipidemia Off and one sincde about 2004.    Obesity        No Known Allergies    Past Surgical History:   Procedure Laterality Date    COLONOSCOPY  2013       Family History   Problem Relation Age of Onset    Cancer Mother     Cancer Father     Colon polyps Father        Social History     Socioeconomic History    Marital status: Single   Tobacco Use    Smoking status: Former     Current packs/day: 0.00     Average packs/day: 0.5 packs/day for 15.0 years (7.5 ttl pk-yrs)     Types: Cigarettes     Start date: 1/1/1999     Quit date: 1/1/2014     Years since quitting: 10.3    Smokeless tobacco: Never    Tobacco comments:     I quit smoking almost 6 years ago.   Vaping Use    Vaping status: Some Days    Substances: Nicotine    Devices: Disposable   Substance and Sexual Activity    Alcohol use: Yes     Alcohol/week: 21.0 standard drinks of alcohol     Types: 6 Cans of beer, 15 Drinks containing 0.5 oz of alcohol per week     Drug use: Never    Sexual activity: Yes     Partners: Female     Birth control/protection: Condom     Comment: I also take Sildenefil, not daily.           Objective   Physical Exam  Constitutional:       General: He is in acute distress.      Appearance: Normal appearance. He is not ill-appearing.   Eyes:      Extraocular Movements: Extraocular movements intact.      Pupils: Pupils are equal, round, and reactive to light.   Cardiovascular:      Rate and Rhythm: Normal rate.      Pulses: Normal pulses.   Pulmonary:      Effort: Pulmonary effort is normal.   Abdominal:      General: Abdomen is flat.      Palpations: Abdomen is soft.   Musculoskeletal:         General: Normal range of motion.      Right lower leg: No edema.      Left lower leg: No edema.   Skin:     General: Skin is warm and dry.      Capillary Refill: Capillary refill takes less than 2 seconds.      Findings: Lesion present.      Comments: See clinical media photograph uploaded, of small site of shave biopsy of the right great toe metatarsal phalangeal joint medial aspect.   Neurological:      General: No focal deficit present.      Mental Status: He is alert and oriented to person, place, and time.   Psychiatric:         Attention and Perception: Attention and perception normal.         Mood and Affect: Mood is anxious.         Speech: Speech normal.         Behavior: Behavior normal. Behavior is cooperative.         Thought Content: Thought content is paranoid.         Cognition and Memory: Cognition and memory normal.         Judgment: Judgment normal.         Procedures           ED Course  ED Course as of 05/22/24 1917   Wed May 22, 2024   1756 Initial evaluation of the patient in results waiting room two []   1914 Plain film chest without acute abnormality. []      ED Course User Index  [] Vinnie Henderson APRN                                             Medical Decision Making  Given the patient's concerns, differential diagnosis includes  extravasation of topical anesthetic agent, to the affected extremity, musculoskeletal sprain versus strain, anxiety reaction, also cannot exclude bronchitis or pneumonia for his other complaint of chronic cough.  Patient will have plain film imaging the chest.  Patient given dose of ibuprofen.  Patient given anticipatory guidance, return precautions for any unilateral leg swelling, pain, venous tenderness, and recommendation to follow-up with his primary care provider.  Patient was agreeable with this plan.    Amount and/or Complexity of Data Reviewed  Radiology: ordered.    Risk  Prescription drug management.        Final diagnoses:   Encounter for post surgical wound check   Chronic cough       ED Disposition  ED Disposition       ED Disposition   Discharge    Condition   Stable    Comment   --               Inocente Cisse, DO  [address] [address]  [phone]      As needed         Medication List      No changes were made to your prescriptions during this visit.            Vinnie Henderson, APRN  05/22/24 2015

## 2024-05-23 ENCOUNTER — NURSE TRIAGE (OUTPATIENT)
Dept: CALL CENTER | Facility: HOSPITAL | Age: 44
End: 2024-05-23
Payer: COMMERCIAL

## 2024-05-23 ENCOUNTER — HOSPITAL ENCOUNTER (EMERGENCY)
Facility: HOSPITAL | Age: 44
Discharge: HOME | End: 2024-05-23
Attending: EMERGENCY MEDICINE
Payer: COMMERCIAL

## 2024-05-23 ENCOUNTER — APPOINTMENT (OUTPATIENT)
Dept: CARDIOLOGY | Facility: HOSPITAL | Age: 44
End: 2024-05-23
Payer: COMMERCIAL

## 2024-05-23 VITALS
TEMPERATURE: 98.7 F | BODY MASS INDEX: 34.46 KG/M2 | HEART RATE: 80 BPM | DIASTOLIC BLOOD PRESSURE: 94 MMHG | SYSTOLIC BLOOD PRESSURE: 155 MMHG | RESPIRATION RATE: 18 BRPM | OXYGEN SATURATION: 98 % | HEIGHT: 73 IN | WEIGHT: 260 LBS

## 2024-05-23 DIAGNOSIS — M79.661 PAIN OF RIGHT CALF: Primary | ICD-10-CM

## 2024-05-23 LAB
BH CV LOWER VASCULAR LEFT COMMON FEMORAL AUGMENT: NORMAL
BH CV LOWER VASCULAR LEFT COMMON FEMORAL PHASIC: NORMAL
BH CV LOWER VASCULAR LEFT COMMON FEMORAL SPONT: NORMAL
BH CV LOWER VASCULAR RIGHT COMMON FEMORAL AUGMENT: NORMAL
BH CV LOWER VASCULAR RIGHT COMMON FEMORAL COMPRESS: NORMAL
BH CV LOWER VASCULAR RIGHT COMMON FEMORAL PHASIC: NORMAL
BH CV LOWER VASCULAR RIGHT COMMON FEMORAL SPONT: NORMAL
BH CV LOWER VASCULAR RIGHT DISTAL FEMORAL COMPRESS: NORMAL
BH CV LOWER VASCULAR RIGHT GASTRONEMIUS COMPRESS: NORMAL
BH CV LOWER VASCULAR RIGHT GREATER SAPH AK COMPRESS: NORMAL
BH CV LOWER VASCULAR RIGHT GREATER SAPH BK COMPRESS: NORMAL
BH CV LOWER VASCULAR RIGHT LESSER SAPH COMPRESS: NORMAL
BH CV LOWER VASCULAR RIGHT MID FEMORAL AUGMENT: NORMAL
BH CV LOWER VASCULAR RIGHT MID FEMORAL COMPRESS: NORMAL
BH CV LOWER VASCULAR RIGHT MID FEMORAL PHASIC: NORMAL
BH CV LOWER VASCULAR RIGHT MID FEMORAL SPONT: NORMAL
BH CV LOWER VASCULAR RIGHT PERONEAL COMPRESS: NORMAL
BH CV LOWER VASCULAR RIGHT POPLITEAL AUGMENT: NORMAL
BH CV LOWER VASCULAR RIGHT POPLITEAL COMPRESS: NORMAL
BH CV LOWER VASCULAR RIGHT POPLITEAL PHASIC: NORMAL
BH CV LOWER VASCULAR RIGHT POPLITEAL SPONT: NORMAL
BH CV LOWER VASCULAR RIGHT POSTERIOR TIBIAL COMPRESS: NORMAL
BH CV LOWER VASCULAR RIGHT PROXIMAL FEMORAL COMPRESS: NORMAL
BH CV LOWER VASCULAR RIGHT SAPHENOFEMORAL JUNCTION COMPRESS: NORMAL
BH CV VAS PRELIMINARY FINDINGS SCRIPTING: 1

## 2024-05-23 PROCEDURE — 93971 EXTREMITY STUDY: CPT | Performed by: INTERNAL MEDICINE

## 2024-05-23 PROCEDURE — 99284 EMERGENCY DEPT VISIT MOD MDM: CPT

## 2024-05-23 PROCEDURE — 93971 EXTREMITY STUDY: CPT

## 2024-05-23 NOTE — TELEPHONE ENCOUNTER
"Patient states that yesterday he has an area removed from his right great toe, at the dermatologist. He said within 5 minutes from that procedure he developed right calf pain and he is concerned that it could be a blood clot. He went to the ER yesterday and was told that it will feel better within 7 hours, but it is still hurting today.   Triage completed and patient advised to see a HCP within the next 4 hours. He asked if Dr Cisse's office would be able to test him for a DVT and I explained that it was unlikely that they had the equipment for US and they may not have an appointment open, but offered to connect him.  He said that he tony go to the ED this afternoon.   Reason for Disposition   [1] Thigh or calf pain AND [2] only 1 side AND [3] present > 1 hour (Exception: Chronic unchanged pain.)    Additional Information   Negative: Looks like a broken bone or dislocated joint (e.g., crooked or deformed)   Negative: Sounds like a life-threatening emergency to the triager   Negative: Followed a leg injury   Negative: Leg swelling is main symptom   Negative: Back pain radiating (shooting) into leg(s)   Negative: Knee pain is main symptom   Negative: Ankle pain is main symptom   Negative: Pregnant   Negative: Postpartum (from 0 to 6 weeks after delivery)   Negative: Chest pain   Negative: Difficulty breathing   Negative: Entire foot is cool or blue in comparison to other side   Negative: Unable to walk   Negative: [1] Red area or streak AND [2] fever   Negative: [1] Swollen joint AND [2] fever   Negative: [1] Cast on leg or ankle AND [2] now increased pain   Negative: Patient sounds very sick or weak to the triager   Negative: [1] SEVERE pain (e.g., excruciating, unable to do any normal activities) AND [2] not improved after 2 hours of pain medicine    Answer Assessment - Initial Assessment Questions  1. ONSET: \"When did the pain start?\"       Yesterday   2. LOCATION: \"Where is the pain located?\"   Right calf pain   3. " "PAIN: \"How bad is the pain?\"    (Scale 1-10; or mild, moderate, severe)    -  MILD (1-3): doesn't interfere with normal activities     -  MODERATE (4-7): interferes with normal activities (e.g., work or school) or awakens from sleep, limping     -  SEVERE (8-10): excruciating pain, unable to do any normal activities, unable to walk     moderate  4. WORK OR EXERCISE: \"Has there been any recent work or exercise that involved this part of the body?\"    Procedure on the right big toe  5. CAUSE: \"What do you think is causing the leg pain?\"      Possible DVT   6. OTHER SYMPTOMS: \"Do you have any other symptoms?\" (e.g., chest pain, back pain, breathing difficulty, swelling, rash, fever, numbness, weakness)      no  7. PREGNANCY: \"Is there any chance you are pregnant?\" \"When was your last menstrual period?\"  No    Protocols used: Leg Pain-ADULT-AH    "

## 2024-05-23 NOTE — TELEPHONE ENCOUNTER
Patient called reporting right calf pain. Was seen in ER today after experiencing calf pain 15-20min after outpatient biopsy on right toe. Patient concerned he has a DVT. States APRN in ER did not feel pain was DVT related, thinks possible reaction to lidocaine used during biopsy procedure. Patient states he did not have labwork done at ER. Denies Chest pain, SOB, swelling or heat in calf. Advised patient to continue to monitor, however if patient having continuing concern, may return to ER for further evaluation. If Chest pain, SOB, Swelling or heat in calf occur, would need to go to ER. Verbalizes understanding.        Reason for Disposition   Leg pain    Additional Information   Negative: Looks like a broken bone or dislocated joint (e.g., crooked or deformed)   Negative: Sounds like a life-threatening emergency to the triager   Negative: Followed a leg injury   Negative: Leg swelling is main symptom   Negative: Back pain radiating (shooting) into leg(s)   Negative: Knee pain is main symptom   Negative: Ankle pain is main symptom   Negative: Pregnant   Negative: Postpartum (from 0 to 6 weeks after delivery)   Negative: Chest pain   Negative: Difficulty breathing   Negative: Entire foot is cool or blue in comparison to other side   Negative: Unable to walk   Negative: [1] Red area or streak AND [2] fever   Negative: [1] Swollen joint AND [2] fever   Negative: [1] Cast on leg or ankle AND [2] now increased pain   Negative: Patient sounds very sick or weak to the triager   Negative: [1] SEVERE pain (e.g., excruciating, unable to do any normal activities) AND [2] not improved after 2 hours of pain medicine   Negative: [1] Thigh or calf pain AND [2] only 1 side AND [3] present > 1 hour (Exception: Chronic unchanged pain.)   Negative: [1] Thigh, calf, or ankle swelling AND [2] only 1 side   Negative: [1] Thigh, calf, or ankle swelling AND [2] bilateral AND [3] 1 side is more swollen   Negative: [1] Red area or streak  "AND [2] large (> 2 in. or 5 cm)   Negative: History of prior \"blood clot\" in leg or lungs (i.e., deep vein thrombosis, pulmonary embolism)   Negative: History of inherited increased risk of blood clots (e.g., Factor 5 Leiden, Anti-thrombin 3, Protein C or Protein S deficiency, Prothrombin mutation)   Negative: Major surgery in past month   Negative: Hip or leg fracture (broken bone) in past month (or had cast on leg or ankle in past month)   Negative: Illness requiring prolonged bedrest in past month (e.g., immobilization, long hospital stay)   Negative: Long-distance travel in past month (e.g., car, bus, train, plane; with trip lasting 6 or more hours)   Negative: Cancer treatment in past six months (or has cancer now)   Negative: [1] Painful rash AND [2] multiple small blisters grouped together (i.e., dermatomal distribution or \"band\" or \"stripe\")   Negative: Looks like a boil, infected sore, deep ulcer or other infected rash (spreading redness, pus)   Negative: [1] Localized rash is very painful AND [2] no fever   Negative: Numbness in a leg or foot (i.e., loss of sensation)   Negative: Localized pain, redness or hard lump along vein   Negative: [1] MODERATE pain (e.g., interferes with normal activities, limping) AND [2] present > 3 days   Negative: [1] Swollen joint AND [2] no fever or redness   Negative: [1] Leg pain which occurs after walking a certain distance AND [2] disappears with rest AND [3] age > 50   Negative: [1] Pain in front of the lower leg(s) (shins) AND [2] occurs with running or jumping exercise (e.g., jogging,  basketball)   Negative: [1] MILD pain (e.g., does not interfere with normal activities) AND [2] present > 7 days   Negative: Leg pain or muscle cramp is a chronic symptom (recurrent or ongoing AND present > 4 weeks)   Negative: Caused by strained muscle   Negative: Caused by overuse from recent vigorous activity (e.g., aerobics, jogging/running, physical work, prolonged walking, " "sports)   Negative: [1] Caused by muscle cramps in the thigh, calf, or foot AND [2] present < 1 hour (brief, now gone)   Negative: Caused by previously diagnosed varicose veins (same pain, worsened by prolonged standing, bulging veins in legs with worm-like appearance)   Negative: Caused by phantom leg pain    Answer Assessment - Initial Assessment Questions  1. ONSET: \"When did the pain start?\"       15-20min after biopsy on big toe performed   2. LOCATION: \"Where is the pain located?\"       Right calf  3. PAIN: \"How bad is the pain?\"    (Scale 1-10; or mild, moderate, severe)    -  MILD (1-3): doesn't interfere with normal activities     -  MODERATE (4-7): interferes with normal activities (e.g., work or school) or awakens from sleep, limping     -  SEVERE (8-10): excruciating pain, unable to do any normal activities, unable to walk      Moderate   4. WORK OR EXERCISE: \"Has there been any recent work or exercise that involved this part of the body?\"       denies   5. CAUSE: \"What do you think is causing the leg pain?\"      Patient concerned for a blood clot   6. OTHER SYMPTOMS: \"Do you have any other symptoms?\" (e.g., chest pain, back pain, breathing difficulty, swelling, rash, fever, numbness, weakness)      Denies   7. PREGNANCY: \"Is there any chance you are pregnant?\" \"When was your last menstrual period?\"      N/a    Protocols used: Leg Pain-ADULT-AH    "

## 2024-05-23 NOTE — DISCHARGE INSTRUCTIONS
Please return with worsening or changing symptoms.  Please follow-up with primary care in the next couple days for further evaluation and treatment.

## 2024-05-23 NOTE — ED PROVIDER NOTES
Subjective   History of Present Illness  Patient is a 43-year-old male who presents with right calf pain after having a shave biopsy performed at dermatology office of the right first toe MTP yesterday.  Patient states that use localized lidocaine he was told.  States he came to the ER yesterday concerned about a blood clot due to the procedure was told to come back if he was still experiencing pain approximately 7 hours later and he was still experiencing pain this morning so he came back to the ER.  Patient states that he has a family friend who  from a pulmonary embolism so he is concerned about a blood clot in his leg.  Patient denies swelling, discoloration, decreased range of motion, numbness/tingling in the left lower extremity  Review of Systems  Nurses Notes reviewed and agree, including vitals, allergies, social history and prior medical history.      All systems reviewed and not pertinent unless noted.    Past Medical History:   Diagnosis Date    Asthma     Childhood asthma    Diverticulitis     Diverticulitis of colon diagnoses about 5 or 6 years ago    Fatty liver     Hyperlipidemia Off and one sincde about .    Obesity        No Known Allergies    Past Surgical History:   Procedure Laterality Date    COLONOSCOPY         Family History   Problem Relation Age of Onset    Cancer Mother     Cancer Father     Colon polyps Father        Social History     Socioeconomic History    Marital status: Single   Tobacco Use    Smoking status: Former     Current packs/day: 0.00     Average packs/day: 0.5 packs/day for 15.0 years (7.5 ttl pk-yrs)     Types: Cigarettes     Start date: 1999     Quit date: 2014     Years since quitting: 10.3    Smokeless tobacco: Never    Tobacco comments:     I quit smoking almost 6 years ago.   Vaping Use    Vaping status: Some Days    Substances: Nicotine    Devices: Disposable   Substance and Sexual Activity    Alcohol use: Yes     Alcohol/week: 21.0 standard  drinks of alcohol     Types: 6 Cans of beer, 15 Drinks containing 0.5 oz of alcohol per week    Drug use: Never    Sexual activity: Yes     Partners: Female     Birth control/protection: Condom     Comment: I also take Sildenefil, not daily.           Objective   Physical Exam  Physical Exam  GENERAL:Well developed.  Appears in no acute distress. Alert and awake.  EYES: No scleral icterus  CV: Regular rate  RESPIRATORY: Normal effort. Normal respirations  MUSCULOSKELETAL: Right great toe has area where the biopsy was performed, no surrounding erythema, edema warmth to this area.  No  edema or discoloration noted of the right calf. No red streaking noted.  Pain to palpation of the right , no palpable cords.  Full active range of motion of right lower extremity without pain  NEURO: Alert, moves all extremities, follows commands  SKIN: Warm, dry    Procedures           ED Course  ED Course as of 05/23/24 1227   Thu May 23, 2024   1224 Duplex venous US did not show a DVT or any acute abnormalities read by radiologist. [OM]      ED Course User Index  [OM] Sasha Huffman PA-C   43-year-old male presents with concern for a DVT in right lower extremity after he had a shave biopsy yesterday of the right great toe.  Pain to palpation right calf, no edema, discoloration or palpable cords noted.  Duplex venous ultrasound ordered.  Ultrasound did not show any acute abnormalities.  Patient resting comfortably.  Discussed with patients the results from today's visit. Discussed with patient strict return precautions and they verbalize understanding. Recommend to them following up with primary care as soon as possible. Patient is discharged hemodynamically stable and comfortable.                                          Medical Decision Making  Problems Addressed:  Pain of right calf: acute illness or injury        Final diagnoses:   Pain of right calf       ED Disposition  ED Disposition       ED Disposition   Discharge    Condition    Stable    Comment   --               Inocente Cisse,   [address] [id number]  [phone]    Schedule an appointment as soon as possible for a visit   follow up         Medication List      No changes were made to your prescriptions during this visit.            [facility], KEELY Baez  05/23/24 1228

## 2024-05-24 ENCOUNTER — NURSE TRIAGE (OUTPATIENT)
Dept: CALL CENTER | Facility: HOSPITAL | Age: 44
End: 2024-05-24
Payer: COMMERCIAL

## 2024-05-25 NOTE — TELEPHONE ENCOUNTER
Caller requesting information about D-Dimer blood testing due to pain in Rt. Calf after skin tag removal procedure and use of Lidocaine 3 days ago. Caller advises and record review confirms that he was seen in ED yesterday at [facility] and had Venous Duplex Scan of lower ext. That returned negative results. Caller educated that the duplex scan was the definitive test for diagnosing DVT and that the D-dimer test was un-necessary since the ED provider had already completed duplex exam. Advised caller to F/U with PCP as needed if pain persisted and to use home-care treatment of warm compresses to area and NSAID use as long as not contraindicated by PCP or other provider. Verbalizes understanding and is agreeable. Advised to call back if new symptoms develop. Denies swelling or redness at this time.     Reason for Disposition   Caused by strained muscle    Additional Information   Negative: Looks like a broken bone or dislocated joint (e.g., crooked or deformed)   Negative: Sounds like a life-threatening emergency to the triager   Negative: Followed a leg injury   Negative: Leg swelling is main symptom   Negative: Back pain radiating (shooting) into leg(s)   Negative: Knee pain is main symptom   Negative: Ankle pain is main symptom   Negative: Pregnant   Negative: Postpartum (from 0 to 6 weeks after delivery)   Negative: Chest pain   Negative: Difficulty breathing   Negative: Entire foot is cool or blue in comparison to other side   Negative: Unable to walk   Negative: [1] Red area or streak AND [2] fever   Negative: [1] Swollen joint AND [2] fever   Negative: [1] Cast on leg or ankle AND [2] now increased pain   Negative: Patient sounds very sick or weak to the triager   Negative: [1] SEVERE pain (e.g., excruciating, unable to do any normal activities) AND [2] not improved after 2 hours of pain medicine   Negative: [1] Thigh or calf pain AND [2] only 1 side AND [3] present > 1 hour (Exception: Chronic unchanged pain.)    "Negative: [1] Thigh, calf, or ankle swelling AND [2] only 1 side   Negative: [1] Thigh, calf, or ankle swelling AND [2] bilateral AND [3] 1 side is more swollen   Negative: [1] Red area or streak AND [2] large (> 2 in. or 5 cm)   Negative: History of prior \"blood clot\" in leg or lungs (i.e., deep vein thrombosis, pulmonary embolism)   Negative: History of inherited increased risk of blood clots (e.g., Factor 5 Leiden, Anti-thrombin 3, Protein C or Protein S deficiency, Prothrombin mutation)   Negative: Major surgery in past month   Negative: Hip or leg fracture (broken bone) in past month (or had cast on leg or ankle in past month)   Negative: Illness requiring prolonged bedrest in past month (e.g., immobilization, long hospital stay)   Negative: Long-distance travel in past month (e.g., car, bus, train, plane; with trip lasting 6 or more hours)   Negative: Cancer treatment in past six months (or has cancer now)   Negative: [1] Painful rash AND [2] multiple small blisters grouped together (i.e., dermatomal distribution or \"band\" or \"stripe\")   Negative: Looks like a boil, infected sore, deep ulcer or other infected rash (spreading redness, pus)   Negative: [1] Localized rash is very painful AND [2] no fever   Negative: Numbness in a leg or foot (i.e., loss of sensation)   Negative: Localized pain, redness or hard lump along vein   Negative: [1] MODERATE pain (e.g., interferes with normal activities, limping) AND [2] present > 3 days   Negative: [1] Swollen joint AND [2] no fever or redness   Negative: [1] Leg pain which occurs after walking a certain distance AND [2] disappears with rest AND [3] age > 50   Negative: [1] Pain in front of the lower leg(s) (shins) AND [2] occurs with running or jumping exercise (e.g., jogging,  basketball)   Negative: [1] MILD pain (e.g., does not interfere with normal activities) AND [2] present > 7 days   Negative: Leg pain or muscle cramp is a chronic symptom (recurrent or ongoing " "AND present > 4 weeks)    Answer Assessment - Initial Assessment Questions  1. ONSET: \"When did the pain start?\"       3 days ago  2. LOCATION: \"Where is the pain located?\"       Rt. Calf  3. PAIN: \"How bad is the pain?\"    (Scale 1-10; or mild, moderate, severe)    -  MILD (1-3): doesn't interfere with normal activities     -  MODERATE (4-7): interferes with normal activities (e.g., work or school) or awakens from sleep, limping     -  SEVERE (8-10): excruciating pain, unable to do any normal activities, unable to walk      Mild  4. WORK OR EXERCISE: \"Has there been any recent work or exercise that involved this part of the body?\"       No  5. CAUSE: \"What do you think is causing the leg pain?\"      Had skin tag removed and had lidocaine used in foot 3 days ago  6. OTHER SYMPTOMS: \"Do you have any other symptoms?\" (e.g., chest pain, back pain, breathing difficulty, swelling, rash, fever, numbness, weakness)      Denies  7. PREGNANCY: \"Is there any chance you are pregnant?\" \"When was your last menstrual period?\"      NA    Protocols used: Leg Pain-ADULT-AH    " normal

## 2024-05-29 ENCOUNTER — OFFICE VISIT (OUTPATIENT)
Dept: FAMILY MEDICINE CLINIC | Facility: CLINIC | Age: 44
End: 2024-05-29
Payer: COMMERCIAL

## 2024-05-29 VITALS
HEIGHT: 73 IN | SYSTOLIC BLOOD PRESSURE: 120 MMHG | BODY MASS INDEX: 35.86 KG/M2 | DIASTOLIC BLOOD PRESSURE: 76 MMHG | HEART RATE: 58 BPM | OXYGEN SATURATION: 98 % | WEIGHT: 270.6 LBS

## 2024-05-29 DIAGNOSIS — R25.2 LEG CRAMP: Primary | ICD-10-CM

## 2024-05-29 DIAGNOSIS — J30.2 SEASONAL ALLERGIES: ICD-10-CM

## 2024-05-29 PROCEDURE — 99214 OFFICE O/P EST MOD 30 MIN: CPT | Performed by: INTERNAL MEDICINE

## 2024-05-29 RX ORDER — CYCLOBENZAPRINE HCL 10 MG
10 TABLET ORAL NIGHTLY PRN
Qty: 7 TABLET | Refills: 0 | Status: SHIPPED | OUTPATIENT
Start: 2024-05-29

## 2024-05-29 RX ORDER — FLUTICASONE PROPIONATE 50 MCG
2 SPRAY, SUSPENSION (ML) NASAL DAILY
Qty: 15.8 ML | Refills: 0 | Status: SHIPPED | OUTPATIENT
Start: 2024-05-29

## 2024-05-29 RX ORDER — FLUTICASONE PROPIONATE 50 MCG
SPRAY, SUSPENSION (ML) NASAL
Qty: 48 G | OUTPATIENT
Start: 2024-05-29

## 2024-05-29 NOTE — PROGRESS NOTES
Chief Complaint   Patient presents with    Leg Pain     Pt recently had shave biopsy with lidocaine and immediately developed pain/cramping in right lower leg and behind knee. Concerned about blood clots or nerve damage. Has had persistent cough, thinks it might be related.        HPI:  Wade Savage is a 43 y.o. male who presents today for follow-up leg pain.  Reports significant cramping and pain after a biopsy at dermatology.  Was evaluated in ER due to concern for blood clot.    ROS:  Constitutional: no fevers, night sweats or unexplained weight loss  Eyes: no vision changes  ENT: no runny nose, ear pain, sore throat  Cardio: no chest pain, palpitations  Pulm: no shortness of breath, wheezing, or cough  GI: no abdominal pain or changes in bowel movements  : no difficulty urinating  MSK: no difficulty ambulating, no joint pain  Neuro: no weakness, dizziness or headache  Psych: no trouble sleeping  Endo: no change in appetite      Past Medical History:   Diagnosis Date    Asthma     Childhood asthma    Diverticulitis     Diverticulitis of colon diagnoses about 5 or 6 years ago    Fatty liver     Hyperlipidemia Off and one sincde about 2004.    Obesity       Family History   Problem Relation Age of Onset    Cancer Mother     Cancer Father     Colon polyps Father       Social History     Socioeconomic History    Marital status: Single   Tobacco Use    Smoking status: Former     Current packs/day: 0.00     Average packs/day: 0.5 packs/day for 15.0 years (7.5 ttl pk-yrs)     Types: Cigarettes     Start date: 1/1/1999     Quit date: 1/1/2014     Years since quitting: 10.4    Smokeless tobacco: Never    Tobacco comments:     I quit smoking almost 6 years ago.   Vaping Use    Vaping status: Some Days    Substances: Nicotine    Devices: Disposable   Substance and Sexual Activity    Alcohol use: Yes     Alcohol/week: 21.0 standard drinks of alcohol     Types: 6 Cans of beer, 15 Drinks containing 0.5 oz of alcohol per  week    Drug use: Never    Sexual activity: Yes     Partners: Female     Birth control/protection: Condom     Comment: I also take Sildenefil, not daily.      No Known Allergies   Immunization History   Administered Date(s) Administered    COVID-19 (PFIZER) BIVALENT 12+YRS 09/29/2022    COVID-19 (PFIZER) Purple Cap Monovalent 03/03/2021, 03/26/2021, 09/06/2021, 10/13/2021    COVID-19 F23 (PFIZER) 12YRS+ (COMIRNATY) 10/10/2023    Flu Vaccine Split Quad 10/16/2014    Fluzone (or Fluarix & Flulaval for VFC) >6mos 10/13/2016, 09/26/2017, 09/24/2019, 10/05/2020, 09/06/2023    Fluzone Quad >6mos (Multi-dose) 10/01/2018, 09/30/2020, 10/01/2020, 09/20/2021, 09/01/2022    Hepatitis A 03/21/2019, 09/24/2019    Influenza Seasonal Injectable 09/16/2021    Influenza, Unspecified 10/16/2014, 10/13/2016, 09/26/2017, 09/24/2019, 10/05/2020, 09/16/2021    Pneumococcal Conjugate 20-Valent (PCV20) 10/05/2023    Tdap 03/25/2010, 04/06/2022        PE:  Vitals:    05/29/24 1047   BP: 120/76   Pulse: 58   SpO2: 98%      Body mass index is 35.71 kg/m².    Gen Appearance: NAD  HEENT: Normocephalic, PERRLA, no thyromegaly, trache midline  Heart: RRR, normal S1 and S2, no murmur  Lungs: CTA b/l, no wheezing, no crackles  Abdomen: Soft, non-tender, non-distended, no guarding and BSx4  MSK: Moves all extremities well, normal gait, no peripheral edema  Pulses: Palpable and equal b/l  Lymph nodes: No palpable lymphadenopathy   Neuro: No focal deficits      Current Outpatient Medications   Medication Sig Dispense Refill    Retin-A 0.025 % cream       Sildenafil Citrate 10 MG/ML reconstituted suspension       ziprasidone (GEODON) 20 MG capsule Take 1 capsule by mouth 2 (Two) Times a Day.      cyclobenzaprine (FLEXERIL) 10 MG tablet Take 1 tablet by mouth At Night As Needed for Muscle Spasms. 7 tablet 0    fluticasone (FLONASE) 50 MCG/ACT nasal spray 2 sprays into the nostril(s) as directed by provider Daily. 15.8 mL 0    hyoscyamine (LEVSIN) 0.125  MG SL tablet Take 1 tablet by mouth Every 4 (Four) Hours As Needed for Cramping. (Patient not taking: Reported on 5/29/2024) 24 tablet 0     No current facility-administered medications for this visit.      No evidence of DVT on ultrasound, likely strained muscle.  Patient reports he was in an awkward position during biopsy which likely led to muscle cramps.  Trial muscle relaxer over the next week.  Recommend PT if no improvement.    Add on Flonase for allergy symptoms.    Diagnoses and all orders for this visit:    1. Leg cramp (Primary)  -     cyclobenzaprine (FLEXERIL) 10 MG tablet; Take 1 tablet by mouth At Night As Needed for Muscle Spasms.  Dispense: 7 tablet; Refill: 0    2. Seasonal allergies  -     fluticasone (FLONASE) 50 MCG/ACT nasal spray; 2 sprays into the nostril(s) as directed by provider Daily.  Dispense: 15.8 mL; Refill: 0         No follow-ups on file.     Dictated Utilizing Dragon Dictation    Please note that portions of this note were completed with a voice recognition program.    Part of this note may be an electronic transcription/translation of spoken language to printed text using the Dragon Dictation System.   done

## 2024-06-25 ENCOUNTER — NURSE TRIAGE (OUTPATIENT)
Dept: CALL CENTER | Facility: HOSPITAL | Age: 44
End: 2024-06-25
Payer: COMMERCIAL

## 2024-06-25 ENCOUNTER — APPOINTMENT (OUTPATIENT)
Dept: CT IMAGING | Facility: HOSPITAL | Age: 44
End: 2024-06-25
Payer: COMMERCIAL

## 2024-06-25 ENCOUNTER — APPOINTMENT (OUTPATIENT)
Dept: CARDIOLOGY | Facility: HOSPITAL | Age: 44
End: 2024-06-25
Payer: COMMERCIAL

## 2024-06-25 ENCOUNTER — HOSPITAL ENCOUNTER (EMERGENCY)
Facility: HOSPITAL | Age: 44
Discharge: HOME | End: 2024-06-25
Attending: EMERGENCY MEDICINE
Payer: COMMERCIAL

## 2024-06-25 VITALS
HEART RATE: 51 BPM | DIASTOLIC BLOOD PRESSURE: 79 MMHG | SYSTOLIC BLOOD PRESSURE: 127 MMHG | OXYGEN SATURATION: 97 % | HEIGHT: 74 IN | BODY MASS INDEX: 33.11 KG/M2 | RESPIRATION RATE: 18 BRPM | TEMPERATURE: 98.5 F | WEIGHT: 258 LBS

## 2024-06-25 DIAGNOSIS — M79.604 PAIN OF RIGHT LOWER EXTREMITY: Primary | ICD-10-CM

## 2024-06-25 DIAGNOSIS — N28.89 LEFT RENAL MASS: ICD-10-CM

## 2024-06-25 LAB
ANION GAP SERPL CALCULATED.3IONS-SCNC: 12 MMOL/L (ref 5–15)
BASOPHILS # BLD AUTO: 0.07 10*3/MM3 (ref 0–0.2)
BASOPHILS NFR BLD AUTO: 1.1 % (ref 0–1.5)
BH CV LOWER VASCULAR LEFT COMMON FEMORAL PHASIC: NORMAL
BH CV LOWER VASCULAR LEFT COMMON FEMORAL SPONT: NORMAL
BH CV LOWER VASCULAR RIGHT COMMON FEMORAL COMPRESS: NORMAL
BH CV LOWER VASCULAR RIGHT COMMON FEMORAL PHASIC: NORMAL
BH CV LOWER VASCULAR RIGHT COMMON FEMORAL SPONT: NORMAL
BH CV LOWER VASCULAR RIGHT DISTAL FEMORAL COMPRESS: NORMAL
BH CV LOWER VASCULAR RIGHT DISTAL FEMORAL PHASIC: NORMAL
BH CV LOWER VASCULAR RIGHT DISTAL FEMORAL SPONT: NORMAL
BH CV LOWER VASCULAR RIGHT GASTRONEMIUS COMPRESS: NORMAL
BH CV LOWER VASCULAR RIGHT GREATER SAPH AK COMPRESS: NORMAL
BH CV LOWER VASCULAR RIGHT GREATER SAPH BK COMPRESS: NORMAL
BH CV LOWER VASCULAR RIGHT LESSER SAPH COMPRESS: NORMAL
BH CV LOWER VASCULAR RIGHT MID FEMORAL COMPRESS: NORMAL
BH CV LOWER VASCULAR RIGHT MID FEMORAL PHASIC: NORMAL
BH CV LOWER VASCULAR RIGHT MID FEMORAL SPONT: NORMAL
BH CV LOWER VASCULAR RIGHT PERONEAL AUGMENT: NORMAL
BH CV LOWER VASCULAR RIGHT PERONEAL COMPRESS: NORMAL
BH CV LOWER VASCULAR RIGHT POPLITEAL COMPRESS: NORMAL
BH CV LOWER VASCULAR RIGHT POPLITEAL PHASIC: NORMAL
BH CV LOWER VASCULAR RIGHT POPLITEAL SPONT: NORMAL
BH CV LOWER VASCULAR RIGHT POSTERIOR TIBIAL AUGMENT: NORMAL
BH CV LOWER VASCULAR RIGHT POSTERIOR TIBIAL COMPRESS: NORMAL
BH CV LOWER VASCULAR RIGHT PROFUNDA FEMORAL PHASIC: NORMAL
BH CV LOWER VASCULAR RIGHT PROFUNDA FEMORAL SPONT: NORMAL
BH CV LOWER VASCULAR RIGHT PROXIMAL FEMORAL COMPRESS: NORMAL
BH CV LOWER VASCULAR RIGHT PROXIMAL FEMORAL PHASIC: NORMAL
BH CV LOWER VASCULAR RIGHT PROXIMAL FEMORAL SPONT: NORMAL
BH CV LOWER VASCULAR RIGHT SAPHENOFEMORAL JUNCTION COMPRESS: NORMAL
BH CV LOWER VASCULAR RIGHT SAPHENOFEMORAL JUNCTION PHASIC: NORMAL
BH CV LOWER VASCULAR RIGHT SAPHENOFEMORAL JUNCTION SPONT: NORMAL
BH CV VAS PRELIMINARY FINDINGS SCRIPTING: 1
BUN SERPL-MCNC: 17 MG/DL (ref 6–20)
BUN/CREAT SERPL: 17.5 (ref 7–25)
CALCIUM SPEC-SCNC: 9.1 MG/DL (ref 8.6–10.5)
CHLORIDE SERPL-SCNC: 104 MMOL/L (ref 98–107)
CO2 SERPL-SCNC: 23 MMOL/L (ref 22–29)
CREAT SERPL-MCNC: 0.97 MG/DL (ref 0.76–1.27)
DEPRECATED RDW RBC AUTO: 42.1 FL (ref 37–54)
EGFRCR SERPLBLD CKD-EPI 2021: 98.1 ML/MIN/1.73
EOSINOPHIL # BLD AUTO: 0.69 10*3/MM3 (ref 0–0.4)
EOSINOPHIL NFR BLD AUTO: 10.9 % (ref 0.3–6.2)
ERYTHROCYTE [DISTWIDTH] IN BLOOD BY AUTOMATED COUNT: 12 % (ref 12.3–15.4)
GLUCOSE SERPL-MCNC: 98 MG/DL (ref 65–99)
HCT VFR BLD AUTO: 42.5 % (ref 37.5–51)
HGB BLD-MCNC: 14.5 G/DL (ref 13–17.7)
IMM GRANULOCYTES # BLD AUTO: 0.01 10*3/MM3 (ref 0–0.05)
IMM GRANULOCYTES NFR BLD AUTO: 0.2 % (ref 0–0.5)
LYMPHOCYTES # BLD AUTO: 1.38 10*3/MM3 (ref 0.7–3.1)
LYMPHOCYTES NFR BLD AUTO: 21.8 % (ref 19.6–45.3)
MCH RBC QN AUTO: 32.5 PG (ref 26.6–33)
MCHC RBC AUTO-ENTMCNC: 34.1 G/DL (ref 31.5–35.7)
MCV RBC AUTO: 95.3 FL (ref 79–97)
MONOCYTES # BLD AUTO: 0.66 10*3/MM3 (ref 0.1–0.9)
MONOCYTES NFR BLD AUTO: 10.4 % (ref 5–12)
NEUTROPHILS NFR BLD AUTO: 3.51 10*3/MM3 (ref 1.7–7)
NEUTROPHILS NFR BLD AUTO: 55.6 % (ref 42.7–76)
NRBC BLD AUTO-RTO: 0 /100 WBC (ref 0–0.2)
PLATELET # BLD AUTO: 328 10*3/MM3 (ref 140–450)
PMV BLD AUTO: 8.6 FL (ref 6–12)
POTASSIUM SERPL-SCNC: 4.3 MMOL/L (ref 3.5–5.2)
RBC # BLD AUTO: 4.46 10*6/MM3 (ref 4.14–5.8)
SODIUM SERPL-SCNC: 139 MMOL/L (ref 136–145)
WBC NRBC COR # BLD AUTO: 6.32 10*3/MM3 (ref 3.4–10.8)

## 2024-06-25 PROCEDURE — 80048 BASIC METABOLIC PNL TOTAL CA: CPT | Performed by: NURSE PRACTITIONER

## 2024-06-25 PROCEDURE — 93971 EXTREMITY STUDY: CPT | Performed by: INTERNAL MEDICINE

## 2024-06-25 PROCEDURE — 75635 CT ANGIO ABDOMINAL ARTERIES: CPT

## 2024-06-25 PROCEDURE — 99285 EMERGENCY DEPT VISIT HI MDM: CPT

## 2024-06-25 PROCEDURE — 85025 COMPLETE CBC W/AUTO DIFF WBC: CPT | Performed by: NURSE PRACTITIONER

## 2024-06-25 PROCEDURE — 93971 EXTREMITY STUDY: CPT

## 2024-06-25 PROCEDURE — 25510000001 IOPAMIDOL PER 1 ML: Performed by: EMERGENCY MEDICINE

## 2024-06-25 RX ORDER — SODIUM CHLORIDE 0.9 % (FLUSH) 0.9 %
10 SYRINGE (ML) INJECTION AS NEEDED
Status: DISCONTINUED | OUTPATIENT
Start: 2024-06-25 | End: 2024-06-25 | Stop reason: HOSPADM

## 2024-06-25 RX ADMIN — IOPAMIDOL 130 ML: 755 INJECTION, SOLUTION INTRAVENOUS at 13:38

## 2024-06-25 NOTE — DISCHARGE INSTRUCTIONS
You are negative for DVT.  You are negative for an arterial occlusion.    Incidental finding on imaging was a left renal mass.    Follow-up with primary care physician.  Follow-up with urology.

## 2024-06-25 NOTE — TELEPHONE ENCOUNTER
"  Maxi has had RLL pain since a biopsy a month ago. He has been tested for DVT and it was negative. He can now see and feel a pulsating in the area of the right calf. No swelling, no color or temperature change.   Reason for Disposition  • Patient sounds very sick or weak to the triager    Additional Information  • Negative: Looks like a broken bone or dislocated joint (e.g., crooked or deformed)  • Negative: Sounds like a life-threatening emergency to the triager  • Negative: Followed a leg injury  • Negative: Leg swelling is main symptom  • Negative: Back pain radiating (shooting) into leg(s)  • Negative: Knee pain is main symptom  • Negative: Ankle pain is main symptom  • Negative: Pregnant  • Negative: Postpartum (from 0 to 6 weeks after delivery)  • Negative: Chest pain  • Negative: Difficulty breathing  • Negative: Entire foot is cool or blue in comparison to other side  • Negative: Unable to walk  • Negative: [1] Red area or streak AND [2] fever  • Negative: [1] Swollen joint AND [2] fever  • Negative: [1] Cast on leg or ankle AND [2] now increased pain    Answer Assessment - Initial Assessment Questions  1. ONSET: \"When did the pain start?\"       Over a month ago after a biopsy.  2. LOCATION: \"Where is the pain located?\"       Right lower calf and behind knee  3. PAIN: \"How bad is the pain?\"    (Scale 1-10; or mild, moderate, severe)    -  MILD (1-3): doesn't interfere with normal activities     -  MODERATE (4-7): interferes with normal activities (e.g., work or school) or awakens from sleep, limping     -  SEVERE (8-10): excruciating pain, unable to do any normal activities, unable to walk      mod  4. WORK OR EXERCISE: \"Has there been any recent work or exercise that involved this part of the body?\"       no  5. CAUSE: \"What do you think is causing the leg pain?\"      Not sure, concerned for DVT.  6. OTHER SYMPTOMS: \"Do you have any other symptoms?\" (e.g., chest pain, back pain, breathing difficulty, " "swelling, rash, fever, numbness, weakness)      Can feel it pulsating now   7. PREGNANCY: \"Is there any chance you are pregnant?\" \"When was your last menstrual period?\"      na    Protocols used: Leg Pain-ADULT-AH    "

## 2024-06-25 NOTE — ED PROVIDER NOTES
EMERGENCY DEPARTMENT ENCOUNTER    Pt Name: Wade Savage  MRN: [id number]  Pt :   1980  Room Number:  24SF/24  Date of encounter:  2024  PCP: Inocente Cisse DO  ED Provider: LORNA Flores    Historian: Patient    HPI:  Chief Complaint: Right leg pain    Context: Wade Savage is a 43 y.o. male who presents to the ED c/o right leg pain.  Patient advises about 6 a month ago he had a blood blister removed off his right foot.  Patient was seen by dermatology.  He was injected with lidocaine.  Immediately when he left dermatology office he felt discomfort in his right lower extremity.  He had pain to his calf that radiated up into his right thigh.  He went to the emergency department was ruled out for DVT at this time.  Patient advises he has had continued issues to his right lower extremity.  He is concerned that there is still a clot to the area.  He reports last night he thought he could see his right thigh pulsating.  She is able to ambulate.  HPI     REVIEW OF SYSTEMS  A chief complaint appropriate review of systems was completed and is negative except as noted in the HPI.     PAST MEDICAL HISTORY  Past Medical History:   Diagnosis Date    Asthma     Childhood asthma    Diverticulitis     Diverticulitis of colon diagnoses about 5 or 6 years ago    Fatty liver     Hyperlipidemia Off and one sincde about .    Obesity        PAST SURGICAL HISTORY  Past Surgical History:   Procedure Laterality Date    COLONOSCOPY         FAMILY HISTORY  Family History   Problem Relation Age of Onset    Cancer Mother     Cancer Father     Colon polyps Father        SOCIAL HISTORY  Social History     Socioeconomic History    Marital status: Single   Tobacco Use    Smoking status: Former     Current packs/day: 0.00     Average packs/day: 0.5 packs/day for 15.0 years (7.5 ttl pk-yrs)     Types: Cigarettes     Start date: 1999     Quit date: 2014     Years since quitting: 10.4    Smokeless  tobacco: Never    Tobacco comments:     I quit smoking almost 6 years ago.   Vaping Use    Vaping status: Some Days    Substances: Nicotine    Devices: Disposable   Substance and Sexual Activity    Alcohol use: Yes     Alcohol/week: 21.0 standard drinks of alcohol     Types: 6 Cans of beer, 15 Drinks containing 0.5 oz of alcohol per week    Drug use: Never    Sexual activity: Yes     Partners: Female     Birth control/protection: Condom     Comment: I also take Sildenefil, not daily.       ALLERGIES  Patient has no known allergies.    PHYSICAL EXAM  Physical Exam  Vitals and nursing note reviewed.   Constitutional:       General: He is not in acute distress.     Appearance: Normal appearance. He is not ill-appearing.   HENT:      Head: Normocephalic and atraumatic.      Nose: Nose normal.      Mouth/Throat:      Mouth: Mucous membranes are moist.   Eyes:      Extraocular Movements: Extraocular movements intact.      Pupils: Pupils are equal, round, and reactive to light.   Cardiovascular:      Rate and Rhythm: Normal rate and regular rhythm.      Pulses: Normal pulses.      Heart sounds: Normal heart sounds.   Pulmonary:      Effort: Pulmonary effort is normal.      Breath sounds: Normal breath sounds.   Musculoskeletal:         General: Normal range of motion.      Cervical back: Normal range of motion.      Right upper leg: No swelling or tenderness.      Right lower leg: Tenderness present. No swelling.      Right foot: Normal range of motion and normal capillary refill. No tenderness. Normal pulse.   Skin:     General: Skin is warm and dry.   Neurological:      General: No focal deficit present.      Mental Status: He is alert and oriented to person, place, and time.   Psychiatric:         Mood and Affect: Mood normal.         Behavior: Behavior normal.           LAB RESULTS  Results for orders placed or performed during the hospital encounter of 06/25/24   Basic Metabolic Panel    Specimen: Blood   Result Value  Ref Range    Glucose 98 65 - 99 mg/dL    BUN 17 6 - 20 mg/dL    Creatinine 0.97 0.76 - 1.27 mg/dL    Sodium 139 136 - 145 mmol/L    Potassium 4.3 3.5 - 5.2 mmol/L    Chloride 104 98 - 107 mmol/L    CO2 23.0 22.0 - 29.0 mmol/L    Calcium 9.1 8.6 - 10.5 mg/dL    BUN/Creatinine Ratio 17.5 7.0 - 25.0    Anion Gap 12.0 5.0 - 15.0 mmol/L    eGFR 98.1 >60.0 mL/min/1.73   CBC Auto Differential    Specimen: Blood   Result Value Ref Range    WBC 6.32 3.40 - 10.80 10*3/mm3    RBC 4.46 4.14 - 5.80 10*6/mm3    Hemoglobin 14.5 13.0 - 17.7 g/dL    Hematocrit 42.5 37.5 - 51.0 %    MCV 95.3 79.0 - 97.0 fL    MCH 32.5 26.6 - 33.0 pg    MCHC 34.1 31.5 - 35.7 g/dL    RDW 12.0 (L) 12.3 - 15.4 %    RDW-SD 42.1 37.0 - 54.0 fl    MPV 8.6 6.0 - 12.0 fL    Platelets 328 140 - 450 10*3/mm3    Neutrophil % 55.6 42.7 - 76.0 %    Lymphocyte % 21.8 19.6 - 45.3 %    Monocyte % 10.4 5.0 - 12.0 %    Eosinophil % 10.9 (H) 0.3 - 6.2 %    Basophil % 1.1 0.0 - 1.5 %    Immature Grans % 0.2 0.0 - 0.5 %    Neutrophils, Absolute 3.51 1.70 - 7.00 10*3/mm3    Lymphocytes, Absolute 1.38 0.70 - 3.10 10*3/mm3    Monocytes, Absolute 0.66 0.10 - 0.90 10*3/mm3    Eosinophils, Absolute 0.69 (H) 0.00 - 0.40 10*3/mm3    Basophils, Absolute 0.07 0.00 - 0.20 10*3/mm3    Immature Grans, Absolute 0.01 0.00 - 0.05 10*3/mm3    nRBC 0.0 0.0 - 0.2 /100 WBC   Duplex Venous Lower Extremity - Right CAR   Result Value Ref Range    Right Common Femoral Spont Y     Right Common Femoral Phasic Y     Right Common Femoral Compress C     Right Saphenofemoral Junction Spont Y     Right Saphenofemoral Junction Phasic Y     Right Saphenofemoral Junction Compress C     Right Profunda Femoral Spont Y     Right Profunda Femoral Phasic Y     Right Proximal Femoral Spont Y     Right Proximal Femoral Phasic Y     Right Proximal Femoral Compress C     Right Mid Femoral Spont Y     Right Mid Femoral Phasic Y     Right Mid Femoral Compress C     Right Distal Femoral Spont Y     Right Distal  Femoral Phasic Y     Right Distal Femoral Compress C     Right Popliteal Spont Y     Right Popliteal Phasic Y     Right Popliteal Compress C     Right Posterior Tibial Compress C     Right Posterior Tibial Augment Y     Right Peroneal Compress C     Right Peroneal Augment Y     Right Gastronemius Compress C     Right Greater Saph AK Compress C     Right Greater Saph BK Compress C     Right Lesser Saph Compress C     BH CV VAS PRELIMINARY FINDINGS SCRIPTING 1.0     Left Common Femoral Spont Y     Left Common Femoral Phasic Y        If labs were ordered, I independently reviewed the results and considered them in treating the patient.    RADIOLOGY  CT Angio Abdominal Aorta Bilateral Iliofem Runoff   Final Result   Impression:   Apparent single vessel runoff to the right foot and two-vessel runoff to the left foot though the unopacified vessels do not demonstrate abrupt cut off but rather gradually lose opacification. This likely means that this is a issue of bolus timing.      Left renal mass which may represent oncocytoma but is most concerning for renal cell carcinoma. Recommend urologic consultation.            Electronically Signed: Jesus Alberto Vargas MD     6/25/2024 2:49 PM EDT     Workstation ID: [id number]        [x] Radiologist's Report Reviewed:  I ordered and independently interpreted the above noted radiographic studies.  See radiologist's dictation for official interpretation.      PROCEDURES    Procedures    No orders to display       MEDICATIONS GIVEN IN ER    Medications   iopamidol (ISOVUE-370) 76 % injection 150 mL (130 mL Intravenous Given 6/25/24 1338)       MEDICAL DECISION MAKING, PROGRESS, and CONSULTS   Medical Decision Making  Wade Savage is a 43 y.o. male who presents to the ED c/o right leg pain.  Patient advises about 6 a month ago he had a blood blister removed off his right foot.  Patient was seen by dermatology.  He was injected with lidocaine.  Immediately when he left dermatology office  he felt discomfort in his right lower extremity.  He had pain to his calf that radiated up into his right thigh.  He went to the emergency department was ruled out for DVT at this time.  Patient advises he has had continued issues to his right lower extremity.  He is concerned that there is still a clot to the area.  He reports last night he thought he could see his right thigh pulsating.  She is able to ambulate.      Problems Addressed:  Left renal mass: undiagnosed new problem with uncertain prognosis     Details: Incidental finding on CTA of the abdomen is a left renal mass.  Patient to follow-up with urology.  Pain of right lower extremity: complicated acute illness or injury     Details: Patient is negative for SVT, DVT.  Patient is negative for arterial occlusion.    Amount and/or Complexity of Data Reviewed  Labs: ordered. Decision-making details documented in ED Course.  Radiology: ordered. Decision-making details documented in ED Course.    Risk  Prescription drug management.        All labs have been independently reviewed by me.  All radiology studies have been interpreted by me and the radiologist dictating the report.  All EKG's have been independently interpreted by me as well as and overseeing attending physician.    [] Discussed with radiology regarding test interpretation:    Discussion below represents my analysis of pertinent findings related to patient's condition, differential diagnosis, treatment plan and final disposition.    Differential diagnosis:  The differential diagnosis associated with the patient's presentation includes: dvt, svt, paresthesias, nerve damage, arterial occlusion    Additional sources  Discussed/ obtained information from independent historians:   [] Spouse  [] Parent  [] Family member  [] Friend  [] EMS   [] Other:  External (non-ED) record review:   [] Inpatient record:   [] Office record:   [x] Outpatient record:   [x] Prior Outpatient labs:   [x] Prior Outpatient  radiology:   [] Primary Care record:   [] Outside ED record:   [] Other:   Patient's care impacted by:   [] Diabetes  [] Hypertension  [x] Hyperlipidemia  [] Hypothyroidism   [] Coronary Artery Disease   [] COPD   [] Cancer   [] Obesity  [] GERD   [] Tobacco Abuse   [] Substance Abuse    [] Anxiety   [] Depression   [] Other:   Care significantly affected by Social Determinants of Health (housing and economic circumstances, unemployment)    [] Yes     [x] No   If yes, Patient's care significantly limited by  Social Determinants of Health including:   [] Inadequate housing   [] Low income   [] Alcoholism and drug addiction in family   [] Problems related to primary support group   [] Unemployment   [] Problems related to employment   [] Other Social Determinants of Health:     Shared decision making: Shared decision making with patient and emergency room attending.  We will go ahead and rule out a DVT and an arterial occlusion at today's visit.    Orders placed during this visit:  Orders Placed This Encounter   Procedures    CT Angio Abdominal Aorta Bilateral Iliofem Runoff    Basic Metabolic Panel    CBC Auto Differential    CBC & Differential       I considered prescription management  with:   [] Pain medication  [] Antiviral  [] Antibiotic   [] Other:   Rationale:  Additional orders considered but not ordered:  The following testing was considered but ultimately not selected after discussion with patient/family:    ED Course:    ED Course as of 06/25/24 1842 Tue Jun 25, 2024   1403 Hemoglobin: 14.5 [KG]   1403 Hematocrit: 42.5 [KG]   1403 WBC: 6.32 [KG]   1403 Creatinine: 0.97 [KG]   1403 Sodium: 139 [KG]   1403 BUN: 17 [KG]   1403 Glucose: 98 [KG]   1448 This patient appears negative for DVT/SVT in his RLE, sorry for taking so long [KG]   1530 CTA reviewed:   Impression:  Apparent single vessel runoff to the right foot and two-vessel runoff to the left foot though the unopacified vessels do not demonstrate  abrupt cut off but rather gradually lose opacification. This likely means that this is a issue of bolus timing.     Left renal mass which may represent oncocytoma but is most concerning for renal cell carcinoma. Recommend urologic consultation.      [KG]      ED Course User Index  [KG] Sanaz Kruger APRN            DIAGNOSIS  Final diagnoses:   Pain of right lower extremity   Left renal mass       DISPOSITION    DISCHARGE    Patient discharged in stable condition.    Reviewed implications of results, diagnosis, meds, responsibility to follow up, warning signs and symptoms of possible worsening, potential complications and reasons to return to ER.    Patient/Family voiced understanding of above instructions.    Discussed plan for discharge, as there is no emergent indication for admission.  Pt/family is agreeable and understands need for follow up and possible repeat testing.  Pt/family is aware that discharge does not mean that nothing is wrong but that it indicates no emergency is currently present that requires admission and they must continue care with follow-up as given below or with a physician of their choice.     FOLLOW-UP  Inocente Cisse DO  [address]  [place] [address]  [phone]          Nita Barboza MD  [address]  [address]  [place] [address]  [phone]               Medication List      No changes were made to your prescriptions during this visit.          ED Disposition       ED Disposition   Discharge    Condition   Stable    Comment   --               Please note that portions of this document were completed with voice recognition software.       Sanaz Kruger APRN  06/25/24 [id number]

## 2024-07-08 ENCOUNTER — OFFICE VISIT (OUTPATIENT)
Dept: UROLOGY | Facility: CLINIC | Age: 44
End: 2024-07-08
Payer: COMMERCIAL

## 2024-07-08 DIAGNOSIS — R31.29 MICROHEMATURIA: Primary | ICD-10-CM

## 2024-07-08 DIAGNOSIS — N28.89 LEFT RENAL MASS: ICD-10-CM

## 2024-07-08 LAB
BILIRUB BLD-MCNC: NEGATIVE MG/DL
CLARITY, POC: CLEAR
COLOR UR: YELLOW
EXPIRATION DATE: ABNORMAL
GLUCOSE UR STRIP-MCNC: NEGATIVE MG/DL
KETONES UR QL: NEGATIVE
LEUKOCYTE EST, POC: NEGATIVE
Lab: ABNORMAL
NITRITE UR-MCNC: NEGATIVE MG/ML
PH UR: 5.5 [PH] (ref 5–8)
PROT UR STRIP-MCNC: NEGATIVE MG/DL
RBC # UR STRIP: ABNORMAL /UL
SP GR UR: 1.02 (ref 1–1.03)
UROBILINOGEN UR QL: NORMAL

## 2024-07-08 PROCEDURE — 99204 OFFICE O/P NEW MOD 45 MIN: CPT | Performed by: UROLOGY

## 2024-07-08 NOTE — PROGRESS NOTES
Office Visit : Renal Mass      Patient Name: Wade Savage  : 1980   MRN: [id number]     Chief Complaint:  Renal Mass Left    Chief Complaint   Patient presents with    Renal mass       Referring Provider: Nikolay Mina MD    History of Present Illness: Wade Savage is a 43 y.o. male with past medical history significant for Asthma, fatty liver, diverticulitis, and hyperlipidemia who presents to Urology today for evaluation of left renal mass.  He reports he has always had microhematuria since childhood.  He reports a few years ago he saw Dr. Cadet and had cysto which was normal.  No flank pain or discomfort.  No unexplained weight loss.   He has smoked previously but quit in .   No family history of  malignancy      Subjective      Review of System: Review of Systems   Genitourinary:  Negative for decreased urine volume, difficulty urinating, dysuria, enuresis, flank pain, frequency, hematuria and urgency.      I have reviewed the ROS documented by my clinical staff, I have updated appropriately and I agree. Nita Barboza MD    Past Medical History:   Past Medical History:   Diagnosis Date    Asthma     Childhood asthma    Diverticulitis     Diverticulitis of colon diagnoses about 5 or 6 years ago    Fatty liver     Hyperlipidemia Off and one sincde about .    Obesity        Past Surgical History:   Past Surgical History:   Procedure Laterality Date    COLONOSCOPY         Family History:   Family History   Problem Relation Age of Onset    Cancer Mother     Cancer Father     Colon polyps Father        Social History:   Social History     Socioeconomic History    Marital status: Single   Tobacco Use    Smoking status: Former     Current packs/day: 0.00     Average packs/day: 0.5 packs/day for 15.0 years (7.5 ttl pk-yrs)     Types: Cigarettes     Start date: 1999     Quit date: 2014     Years since quitting: 10.5     Passive exposure: Past    Smokeless tobacco: Never     Tobacco comments:     I quit smoking almost 6 years ago.   Vaping Use    Vaping status: Some Days    Substances: Nicotine    Devices: Disposable   Substance and Sexual Activity    Alcohol use: Yes     Alcohol/week: 21.0 standard drinks of alcohol     Types: 6 Cans of beer, 15 Drinks containing 0.5 oz of alcohol per week    Drug use: Never    Sexual activity: Not Currently     Partners: Female     Birth control/protection: Condom     Comment: I also take Sildenefil, not daily.       Medications:     Current Outpatient Medications:     cyclobenzaprine (FLEXERIL) 10 MG tablet, Take 1 tablet by mouth At Night As Needed for Muscle Spasms., Disp: 7 tablet, Rfl: 0    fluticasone (FLONASE) 50 MCG/ACT nasal spray, 2 sprays into the nostril(s) as directed by provider Daily., Disp: 15.8 mL, Rfl: 0    Retin-A 0.025 % cream, , Disp: , Rfl:     Sildenafil Citrate 10 MG/ML reconstituted suspension, , Disp: , Rfl:     ziprasidone (GEODON) 20 MG capsule, Take 1 capsule by mouth 2 (Two) Times a Day., Disp: , Rfl:     hyoscyamine (LEVSIN) 0.125 MG SL tablet, Take 1 tablet by mouth Every 4 (Four) Hours As Needed for Cramping. (Patient not taking: Reported on 5/29/2024), Disp: 24 tablet, Rfl: 0    Allergies:   No Known Allergies    IPSS Questionnaire (AUA-7):  Over the past month…    1)  Incomplete Emptying  How often have you had a sensation of not emptying your bladder?  1 - Less than 1 time in 5   2)  Frequency  How often have you had to urinate less than every two hours? 3 - About half the time   3)  Intermittency  How often have you found you stopped and started again several times when you urinated?  0 - Not at all   4) Urgency  How often have you found it difficult to postpone urination?  0 - Not at all   5) Weak Stream  How often have you had a weak urinary stream?  1 - Less than 1 time in 5   6) Straining  How often have you had to push or strain to begin urination?  0 - Not at all   7) Nocturia  How many times did you  typically get up at night to urinate?  1 - 1 time   Total Score:  6   The International Prostate Symptom Score (IPSS) is used to screen, diagnose, track symptoms of benign prostatic hyperplasia (BPH).    0-7 pts (Mild Symptoms)  / 8-19 pts (Moderate) / 20-35 (Severe)    Quality of life due to urinary symptoms:  If you were to spend the rest of your life with your urinary condition the way it is now, how would you feel about that? 1-Pleased   Urine Leakage (Incontinence) 0-No Leakage     Objective     Physical Exam:   Vital Signs: There were no vitals filed for this visit.  There is no height or weight on file to calculate BMI.     Physical Exam  Vitals and nursing note reviewed.   Constitutional:       General: He is awake. He is not in acute distress.     Appearance: Normal appearance. He is well-developed. He is obese.   HENT:      Head: Normocephalic and atraumatic.      Right Ear: External ear normal.      Left Ear: External ear normal.      Nose: Nose normal.   Eyes:      Conjunctiva/sclera: Conjunctivae normal.   Pulmonary:      Effort: Pulmonary effort is normal.   Abdominal:      General: There is no distension.      Palpations: Abdomen is soft. There is no mass.      Tenderness: There is no abdominal tenderness. There is no right CVA tenderness, left CVA tenderness, guarding or rebound.      Hernia: No hernia is present. There is no hernia in the left inguinal area or right inguinal area.   Genitourinary:     Pubic Area: No rash.       Rectum: No mass or tenderness. Normal anal tone.   Musculoskeletal:      Cervical back: Normal range of motion.   Lymphadenopathy:      Lower Body: No right inguinal adenopathy. No left inguinal adenopathy.   Skin:     General: Skin is warm.   Neurological:      General: No focal deficit present.      Mental Status: He is alert and oriented to person, place, and time.   Psychiatric:         Behavior: Behavior normal. Behavior is cooperative.         Labs:   Brief Urine Lab  Results  (Last result in the past 365 days)        Color   Clarity   Blood   Leuk Est   Nitrite   Protein   CREAT   Urine HCG        07/08/24 1342 Yellow   Clear   Large   Negative   Negative   Negative                        Lab Results   Component Value Date    GLUCOSE 98 06/25/2024    CALCIUM 9.1 06/25/2024     06/25/2024    K 4.3 06/25/2024    CO2 23.0 06/25/2024     06/25/2024    BUN 17 06/25/2024    CREATININE 0.97 06/25/2024    BCR 17.5 06/25/2024    ANIONGAP 12.0 06/25/2024       Lab Results   Component Value Date    WBC 6.32 06/25/2024    HGB 14.5 06/25/2024    HCT 42.5 06/25/2024    MCV 95.3 06/25/2024     06/25/2024       Images:   CT Angio Abdominal Aorta Bilateral Iliofem Runoff    Result Date: 6/25/2024  Impression: Apparent single vessel runoff to the right foot and two-vessel runoff to the left foot though the unopacified vessels do not demonstrate abrupt cut off but rather gradually lose opacification. This likely means that this is a issue of bolus timing. Left renal mass which may represent oncocytoma but is most concerning for renal cell carcinoma. Recommend urologic consultation. Electronically Signed: Jesus Alberto Vargas MD  6/25/2024 2:49 PM EDT  Workstation ID: [id number]    XR Chest 1 View    Result Date: 5/22/2024  Impression: No acute process. Electronically Signed: Chevy Llanes MD  5/22/2024 6:29 PM EDT  Workstation ID: [id number]    XR Hand 3+ View Right (In Office)    Result Date: 4/30/2024  1.No evidence for displaced fracture or dislocation. Electronically Signed: Jules Palma MD  4/30/2024 1:55 PM EDT  Workstation ID: [id number]      Measures:   Tobacco:   Wade Savage  reports that he quit smoking about 10 years ago. His smoking use included cigarettes. He started smoking about 25 years ago. He has a 7.5 pack-year smoking history. He has been exposed to tobacco smoke. He has never used smokeless tobacco.     Urine Incontinence: Patient reports that he is not currently  experiencing any symptoms of urinary incontinence.       Assessment / Plan      Assessment/Plan:   Wade Savage is a 43 y.o. male who presented today for evaluation of recently identified left renal mass.     The mass is 3.5 cm in size and has a spoke wheeled pattern consistent with oncocytoma.      I discussed his findings on CT at this time it is unclear if this is RCC.  I will obtain MRI and have him follow up.            ^ 2021 American Urological Association Guidelines Algorithm for the Workup and Management of Renal Masses    Diagnoses and all orders for this visit:    1. Microhematuria (Primary)  -     POC Urinalysis Dipstick, Automated    2. Left renal mass  -     MRI Abdomen With & Without Contrast; Future          Follow Up:   Return in about 3 weeks (around 7/29/2024).      Nita Barboza MD  [facility] [place]

## 2024-07-15 ENCOUNTER — HOSPITAL ENCOUNTER (OUTPATIENT)
Dept: MRI IMAGING | Facility: HOSPITAL | Age: 44
Discharge: HOME | End: 2024-07-15
Admitting: UROLOGY
Payer: COMMERCIAL

## 2024-07-15 DIAGNOSIS — N28.89 LEFT RENAL MASS: ICD-10-CM

## 2024-07-15 PROCEDURE — 74183 MRI ABD W/O CNTR FLWD CNTR: CPT

## 2024-07-15 PROCEDURE — 0 GADOBENATE DIMEGLUMINE 529 MG/ML SOLUTION: Performed by: UROLOGY

## 2024-07-15 PROCEDURE — A9577 INJ MULTIHANCE: HCPCS | Performed by: UROLOGY

## 2024-07-15 RX ADMIN — GADOBENATE DIMEGLUMINE 20 ML: 529 INJECTION, SOLUTION INTRAVENOUS at 11:13

## 2024-07-18 DIAGNOSIS — N28.89 LEFT RENAL MASS: Primary | ICD-10-CM

## 2024-07-29 ENCOUNTER — OFFICE VISIT (OUTPATIENT)
Dept: UROLOGY | Facility: CLINIC | Age: 44
End: 2024-07-29
Payer: COMMERCIAL

## 2024-07-29 VITALS
OXYGEN SATURATION: 98 % | HEIGHT: 74 IN | SYSTOLIC BLOOD PRESSURE: 147 MMHG | BODY MASS INDEX: 33.11 KG/M2 | WEIGHT: 258 LBS | HEART RATE: 81 BPM | DIASTOLIC BLOOD PRESSURE: 82 MMHG

## 2024-07-29 DIAGNOSIS — N28.89 LEFT RENAL MASS: Primary | ICD-10-CM

## 2024-07-29 PROCEDURE — 99214 OFFICE O/P EST MOD 30 MIN: CPT | Performed by: UROLOGY

## 2024-07-29 NOTE — PROGRESS NOTES
Follow Up Office Visit      Patient Name: Wade Savage  : 1980   MRN: [id number]     Chief Complaint:    Chief Complaint   Patient presents with    renal mass       Referring Provider: No ref. provider found    History of Present Illness: Wade Savage is a 43 y.o. male who presents today for follow up of renal mass.  He is here for follow up of his MRI.  He has a left intraparenchymal left lower pole renal mass based off his MRI.  He reports no change since his last visit.     I reviewed his MRI with him which shows a left lower pole renal mass.      Subjective      Review of System:   Review of Systems   Constitutional:  Negative for chills, fatigue, fever and unexpected weight change.   HENT:  Negative for congestion, rhinorrhea and sore throat.    Eyes:  Negative for visual disturbance.   Respiratory:  Negative for apnea, cough, chest tightness and shortness of breath.    Cardiovascular:  Negative for chest pain.   Gastrointestinal:  Negative for abdominal pain, constipation, diarrhea, nausea and vomiting.   Endocrine: Negative for polydipsia and polyuria.   Genitourinary:  Negative for difficulty urinating, dysuria, enuresis, flank pain, frequency, genital sores, hematuria and urgency.   Musculoskeletal:  Negative for gait problem.   Skin:  Negative for rash and wound.   Allergic/Immunologic: Negative for immunocompromised state.   Neurological:  Negative for dizziness, tremors, syncope, weakness and light-headedness.   Hematological:  Does not bruise/bleed easily.      I have reviewed the ROS documented by my clinical staff, I have updated appropriately and I agree. Nita Barboza MD    I have reviewed and the following portions of the patient's history were updated as appropriate: past family history, past medical history, past social history, past surgical history and problem list.    Past Medical History:   Past Medical History:   Diagnosis Date    Asthma     Childhood asthma     Diverticulitis     Diverticulitis of colon diagnoses about 5 or 6 years ago    Fatty liver     Hyperlipidemia Off and one sincde about 2004.    Obesity        Past Surgical History:  Past Surgical History:   Procedure Laterality Date    COLONOSCOPY  2013       Family History:   family history includes Cancer in his father and mother; Colon polyps in his father.   Otherwise pertinent FHx was reviewed and not pertinent to current issue.    Social History:    reports that he quit smoking about 10 years ago. His smoking use included cigarettes. He started smoking about 25 years ago. He has a 7.5 pack-year smoking history. He has been exposed to tobacco smoke. He has never used smokeless tobacco. He reports current alcohol use of about 16.0 standard drinks of alcohol per week. He reports that he does not currently use drugs.    Medications:     Current Outpatient Medications:     Retin-A 0.025 % cream, , Disp: , Rfl:     Sildenafil Citrate 10 MG/ML reconstituted suspension, , Disp: , Rfl:     ziprasidone (GEODON) 20 MG capsule, Take 1 capsule by mouth 2 (Two) Times a Day., Disp: , Rfl:     Allergies:   No Known Allergies    IPSS Questionnaire (AUA-7):  Over the past month…    1)  Incomplete Emptying  How often have you had a sensation of not emptying your bladder?  1 - Less than 1 time in 5   2)  Frequency  How often have you had to urinate less than every two hours? 2 - Less than half the time   3)  Intermittency  How often have you found you stopped and started again several times when you urinated?  1 - Less than 1 time in 5   4) Urgency  How often have you found it difficult to postpone urination?  0 - Not at all   5) Weak Stream  How often have you had a weak urinary stream?  2 - Less than half the time   6) Straining  How often have you had to push or strain to begin urination?  0 - Not at all   7) Nocturia  How many times did you typically get up at night to urinate?  1 - 1 time   Total Score:  7   The  "International Prostate Symptom Score (IPSS) is used to screen, diagnose, track symptoms of benign prostatic hyperplasia (BPH).    0-7 pts (Mild Symptoms)  / 8-19 pts (Moderate) / 20-35 (Severe)    Quality of life due to urinary symptoms:  If you were to spend the rest of your life with your urinary condition the way it is now, how would you feel about that? 1-Pleased   Urine Leakage (Incontinence) 0-No Leakage         Objective     Physical Exam:   Vital Signs:   Vitals:    07/29/24 1507   BP: 147/82   Pulse: 81   SpO2: 98%   Weight: 117 kg (258 lb)   Height: 188 cm (74.02\")   PainSc:   4     Body mass index is 33.11 kg/m².     Physical Exam  Vitals and nursing note reviewed.   Constitutional:       General: He is awake. He is not in acute distress.     Appearance: Normal appearance. He is well-developed. He is obese.   HENT:      Head: Normocephalic and atraumatic.      Right Ear: External ear normal.      Left Ear: External ear normal.      Nose: Nose normal.   Eyes:      Conjunctiva/sclera: Conjunctivae normal.   Pulmonary:      Effort: Pulmonary effort is normal.   Abdominal:      General: There is no distension.      Palpations: Abdomen is soft. There is no mass.      Tenderness: There is no abdominal tenderness. There is no right CVA tenderness, left CVA tenderness, guarding or rebound.      Hernia: No hernia is present. There is no hernia in the left inguinal area or right inguinal area.   Genitourinary:     Pubic Area: No rash.       Penis: Normal.       Testes: Normal.      Prostate: Normal.      Rectum: Normal. No mass or tenderness. Normal anal tone.   Musculoskeletal:      Cervical back: Normal range of motion.   Lymphadenopathy:      Lower Body: No right inguinal adenopathy. No left inguinal adenopathy.   Skin:     General: Skin is warm.   Neurological:      General: No focal deficit present.      Mental Status: He is alert and oriented to person, place, and time.   Psychiatric:         Behavior: " "Behavior normal. Behavior is cooperative.         Labs:   Brief Urine Lab Results  (Last result in the past 365 days)        Color   Clarity   Blood   Leuk Est   Nitrite   Protein   CREAT   Urine HCG        07/08/24 1342 Yellow   Clear   Large   Negative   Negative   Negative                        Lab Results   Component Value Date    GLUCOSE 98 06/25/2024    CALCIUM 9.1 06/25/2024     06/25/2024    K 4.3 06/25/2024    CO2 23.0 06/25/2024     06/25/2024    BUN 17 06/25/2024    CREATININE 0.97 06/25/2024    BCR 17.5 06/25/2024    ANIONGAP 12.0 06/25/2024       Lab Results   Component Value Date    WBC 6.32 06/25/2024    HGB 14.5 06/25/2024    HCT 42.5 06/25/2024    MCV 95.3 06/25/2024     06/25/2024       No results found for: \"PSA\"    Images:   MRI Abdomen With & Without Contrast    Result Date: 7/16/2024  Impression: 1. Enhancing 3.8 x 3.3 cm mass involving the medial lower pole left kidney highly suspicious for renal cell carcinoma. 2. No adenopathy or evidence of metastatic disease in the abdomen. Electronically Signed: Chevy Llanes MD  7/16/2024 11:14 AM EDT  Workstation ID: [id number]    CT Angio Abdominal Aorta Bilateral Iliofem Runoff    Result Date: 6/25/2024  Impression: Apparent single vessel runoff to the right foot and two-vessel runoff to the left foot though the unopacified vessels do not demonstrate abrupt cut off but rather gradually lose opacification. This likely means that this is a issue of bolus timing. Left renal mass which may represent oncocytoma but is most concerning for renal cell carcinoma. Recommend urologic consultation. Electronically Signed: Jesus Alberto Vargas MD  6/25/2024 2:49 PM EDT  Workstation ID: [id number]    XR Chest 1 View    Result Date: 5/22/2024  Impression: No acute process. Electronically Signed: Chevy Llanes MD  5/22/2024 6:29 PM EDT  Workstation ID: [id number]    XR Hand 3+ View Right (In Office)    Result Date: 4/30/2024  1.No evidence for displaced " fracture or dislocation. Electronically Signed: Jules Palma MD  4/30/2024 1:55 PM EDT  Workstation ID: [id number]      Measures:   Tobacco:   Wade Savage  reports that he quit smoking about 10 years ago. His smoking use included cigarettes. He started smoking about 25 years ago. He has a 7.5 pack-year smoking history. He has been exposed to tobacco smoke. He has never used smokeless tobacco.       Urine Incontinence: Patient reports that he is not currently experiencing any symptoms of urinary incontinence.         Assessment / Plan      Assessment/Plan:   43 y.o. male who presented today for follow up of left renal mass.  Based off his MRI this is concerning for RCC.  I reviewed his options with him including partial nephrectomy versus radical.  He would prefer partial and I believe that would be his best option.  I discussed that he would benefit from referral at .  He has been scheduled all ready.    I will see him back PRN.      Diagnoses and all orders for this visit:    1. Left renal mass (Primary)         Follow Up:   Return if symptoms worsen or fail to improve.    I spent approximately 30 minutes providing clinical care for this patient; including review of patient's chart and provider documentation, face to face time spent with patient in examination room (obtaining history, performing physical exam, discussing diagnosis and management options), placing orders, and completing patient documentation.     Nita Barboza MD  [facility] [place]

## 2024-09-20 ENCOUNTER — READMISSION MANAGEMENT (OUTPATIENT)
Dept: CALL CENTER | Facility: HOSPITAL | Age: 44
End: 2024-09-20
Payer: COMMERCIAL

## 2024-09-23 ENCOUNTER — TRANSITIONAL CARE MANAGEMENT TELEPHONE ENCOUNTER (OUTPATIENT)
Dept: CALL CENTER | Facility: HOSPITAL | Age: 44
End: 2024-09-23
Payer: COMMERCIAL

## 2024-09-24 ENCOUNTER — TRANSITIONAL CARE MANAGEMENT TELEPHONE ENCOUNTER (OUTPATIENT)
Dept: CALL CENTER | Facility: HOSPITAL | Age: 44
End: 2024-09-24
Payer: COMMERCIAL

## 2024-10-27 DIAGNOSIS — J30.2 SEASONAL ALLERGIES: ICD-10-CM

## 2024-10-28 NOTE — TELEPHONE ENCOUNTER
Rx Refill Note  Requested Prescriptions     Pending Prescriptions Disp Refills    fluticasone (FLONASE) 50 MCG/ACT nasal spray [Pharmacy Med Name: FLUTICASONE 50MCG NASAL SP (120) RX] 16 g      Sig: INSTILL 2 SPRAYS INTO THE NOSTRILS AS DIRECTED BY PROVIDER DAILY      Last office visit with prescribing clinician: 5/29/2024   Last telemedicine visit with prescribing clinician: Visit date not found   Next office visit with prescribing clinician: Visit date not found       Daniella Mcdaniel MA  10/28/24, 09:46 EDT

## 2024-10-29 RX ORDER — FLUTICASONE PROPIONATE 50 MCG
SPRAY, SUSPENSION (ML) NASAL
Qty: 16 G | Refills: 5 | Status: SHIPPED | OUTPATIENT
Start: 2024-10-29

## 2024-11-26 ENCOUNTER — PATIENT MESSAGE (OUTPATIENT)
Dept: FAMILY MEDICINE CLINIC | Facility: CLINIC | Age: 44
End: 2024-11-26
Payer: COMMERCIAL

## 2024-11-26 DIAGNOSIS — Z12.12 ENCOUNTER FOR COLORECTAL CANCER SCREENING: Primary | ICD-10-CM

## 2024-11-26 DIAGNOSIS — Z12.11 ENCOUNTER FOR COLORECTAL CANCER SCREENING: Primary | ICD-10-CM

## 2024-12-03 ENCOUNTER — PREP FOR SURGERY (OUTPATIENT)
Dept: OTHER | Facility: HOSPITAL | Age: 44
End: 2024-12-03
Payer: COMMERCIAL

## 2024-12-03 DIAGNOSIS — Z12.11 SCREEN FOR COLON CANCER: Primary | ICD-10-CM

## 2025-03-14 ENCOUNTER — OFFICE VISIT (OUTPATIENT)
Dept: FAMILY MEDICINE CLINIC | Facility: CLINIC | Age: 45
End: 2025-03-14
Payer: COMMERCIAL

## 2025-03-14 ENCOUNTER — LAB (OUTPATIENT)
Dept: LAB | Facility: HOSPITAL | Age: 45
End: 2025-03-14
Payer: COMMERCIAL

## 2025-03-14 VITALS
SYSTOLIC BLOOD PRESSURE: 134 MMHG | WEIGHT: 282 LBS | HEIGHT: 74 IN | OXYGEN SATURATION: 97 % | HEART RATE: 90 BPM | BODY MASS INDEX: 36.19 KG/M2 | DIASTOLIC BLOOD PRESSURE: 74 MMHG

## 2025-03-14 DIAGNOSIS — Z12.5 ENCOUNTER FOR PROSTATE CANCER SCREENING: ICD-10-CM

## 2025-03-14 DIAGNOSIS — E55.9 VITAMIN D DEFICIENCY: ICD-10-CM

## 2025-03-14 DIAGNOSIS — Z00.00 PREVENTATIVE HEALTH CARE: ICD-10-CM

## 2025-03-14 DIAGNOSIS — Z12.11 COLON CANCER SCREENING: ICD-10-CM

## 2025-03-14 DIAGNOSIS — Z00.00 PREVENTATIVE HEALTH CARE: Primary | ICD-10-CM

## 2025-03-14 LAB
25(OH)D3 SERPL-MCNC: 23.8 NG/ML (ref 30–100)
ALBUMIN SERPL-MCNC: 4.4 G/DL (ref 3.5–5.2)
ALBUMIN/GLOB SERPL: 1.6 G/DL
ALP SERPL-CCNC: 75 U/L (ref 39–117)
ALT SERPL W P-5'-P-CCNC: 24 U/L (ref 1–41)
ANION GAP SERPL CALCULATED.3IONS-SCNC: 11.8 MMOL/L (ref 5–15)
AST SERPL-CCNC: 22 U/L (ref 1–40)
BACTERIA UR QL AUTO: NORMAL /HPF
BASOPHILS # BLD AUTO: 0.07 10*3/MM3 (ref 0–0.2)
BASOPHILS NFR BLD AUTO: 1.1 % (ref 0–1.5)
BILIRUB SERPL-MCNC: 0.2 MG/DL (ref 0–1.2)
BILIRUB UR QL STRIP: NEGATIVE
BUN SERPL-MCNC: 14 MG/DL (ref 6–20)
BUN/CREAT SERPL: 14.7 (ref 7–25)
CALCIUM SPEC-SCNC: 9.7 MG/DL (ref 8.6–10.5)
CHLORIDE SERPL-SCNC: 102 MMOL/L (ref 98–107)
CHOLEST SERPL-MCNC: 209 MG/DL (ref 0–200)
CLARITY UR: CLEAR
CO2 SERPL-SCNC: 24.2 MMOL/L (ref 22–29)
COLOR UR: YELLOW
CREAT SERPL-MCNC: 0.95 MG/DL (ref 0.76–1.27)
DEPRECATED RDW RBC AUTO: 44.3 FL (ref 37–54)
EGFRCR SERPLBLD CKD-EPI 2021: 100 ML/MIN/1.73
EOSINOPHIL # BLD AUTO: 0.3 10*3/MM3 (ref 0–0.4)
EOSINOPHIL NFR BLD AUTO: 4.8 % (ref 0.3–6.2)
ERYTHROCYTE [DISTWIDTH] IN BLOOD BY AUTOMATED COUNT: 12.6 % (ref 12.3–15.4)
GLOBULIN UR ELPH-MCNC: 2.8 GM/DL
GLUCOSE SERPL-MCNC: 92 MG/DL (ref 65–99)
GLUCOSE UR STRIP-MCNC: NEGATIVE MG/DL
HBA1C MFR BLD: 5.5 % (ref 4.8–5.6)
HCT VFR BLD AUTO: 41.2 % (ref 37.5–51)
HDLC SERPL-MCNC: 44 MG/DL (ref 40–60)
HGB BLD-MCNC: 14.3 G/DL (ref 13–17.7)
HGB UR QL STRIP.AUTO: ABNORMAL
HOLD SPECIMEN: NORMAL
HYALINE CASTS UR QL AUTO: NORMAL /LPF
IMM GRANULOCYTES # BLD AUTO: 0.03 10*3/MM3 (ref 0–0.05)
IMM GRANULOCYTES NFR BLD AUTO: 0.5 % (ref 0–0.5)
KETONES UR QL STRIP: NEGATIVE
LDLC SERPL CALC-MCNC: 131 MG/DL (ref 0–100)
LDLC/HDLC SERPL: 2.87 {RATIO}
LEUKOCYTE ESTERASE UR QL STRIP.AUTO: NEGATIVE
LYMPHOCYTES # BLD AUTO: 1.41 10*3/MM3 (ref 0.7–3.1)
LYMPHOCYTES NFR BLD AUTO: 22.5 % (ref 19.6–45.3)
MCH RBC QN AUTO: 33.3 PG (ref 26.6–33)
MCHC RBC AUTO-ENTMCNC: 34.7 G/DL (ref 31.5–35.7)
MCV RBC AUTO: 95.8 FL (ref 79–97)
MONOCYTES # BLD AUTO: 0.97 10*3/MM3 (ref 0.1–0.9)
MONOCYTES NFR BLD AUTO: 15.4 % (ref 5–12)
NEUTROPHILS NFR BLD AUTO: 3.5 10*3/MM3 (ref 1.7–7)
NEUTROPHILS NFR BLD AUTO: 55.7 % (ref 42.7–76)
NITRITE UR QL STRIP: NEGATIVE
NRBC BLD AUTO-RTO: 0 /100 WBC (ref 0–0.2)
PH UR STRIP.AUTO: 6 [PH] (ref 5–8)
PLATELET # BLD AUTO: 327 10*3/MM3 (ref 140–450)
PMV BLD AUTO: 8.8 FL (ref 6–12)
POTASSIUM SERPL-SCNC: 4.7 MMOL/L (ref 3.5–5.2)
PROT SERPL-MCNC: 7.2 G/DL (ref 6–8.5)
PROT UR QL STRIP: NEGATIVE
PSA SERPL-MCNC: 0.93 NG/ML (ref 0–4)
RBC # BLD AUTO: 4.3 10*6/MM3 (ref 4.14–5.8)
RBC # UR STRIP: NORMAL /HPF
REF LAB TEST METHOD: NORMAL
SODIUM SERPL-SCNC: 138 MMOL/L (ref 136–145)
SP GR UR STRIP: 1.02 (ref 1–1.03)
SQUAMOUS #/AREA URNS HPF: NORMAL /HPF
T4 FREE SERPL-MCNC: 1.05 NG/DL (ref 0.92–1.68)
TRIGL SERPL-MCNC: 193 MG/DL (ref 0–150)
TSH SERPL DL<=0.05 MIU/L-ACNC: 0.55 UIU/ML (ref 0.27–4.2)
UROBILINOGEN UR QL STRIP: ABNORMAL
VLDLC SERPL-MCNC: 34 MG/DL (ref 5–40)
WBC # UR STRIP: NORMAL /HPF
WBC NRBC COR # BLD AUTO: 6.28 10*3/MM3 (ref 3.4–10.8)

## 2025-03-14 PROCEDURE — 85025 COMPLETE CBC W/AUTO DIFF WBC: CPT

## 2025-03-14 PROCEDURE — 83036 HEMOGLOBIN GLYCOSYLATED A1C: CPT

## 2025-03-14 PROCEDURE — G0103 PSA SCREENING: HCPCS

## 2025-03-14 PROCEDURE — 82306 VITAMIN D 25 HYDROXY: CPT

## 2025-03-14 PROCEDURE — 84443 ASSAY THYROID STIM HORMONE: CPT

## 2025-03-14 PROCEDURE — 84439 ASSAY OF FREE THYROXINE: CPT

## 2025-03-14 PROCEDURE — 80053 COMPREHEN METABOLIC PANEL: CPT

## 2025-03-14 PROCEDURE — 81001 URINALYSIS AUTO W/SCOPE: CPT

## 2025-03-14 PROCEDURE — 80061 LIPID PANEL: CPT

## 2025-03-21 NOTE — PROGRESS NOTES
Chief Complaint   Patient presents with    Annual Exam    Leg Discomfort     Right leg       HPI:  Wade Savage is a 44 y.o. male who presents today for annual exam.  [place]    ROS:  Constitutional: no fevers, night sweats or unexplained weight loss  Eyes: no vision changes  ENT: no runny nose, ear pain, sore throat  Cardio: no chest pain, palpitations  Pulm: no shortness of breath, wheezing, or cough  GI: no abdominal pain or changes in bowel movements  : no difficulty urinating  MSK: no difficulty ambulating, no joint pain  Neuro: no weakness, dizziness or headache  Psych: no trouble sleeping  Endo: no change in appetite      Past Medical History:   Diagnosis Date    Asthma     Childhood asthma    Colon polyp     Diverticulitis     Diverticulitis of colon diagnoses about 5 or 6 years ago    Diverticulosis     Fatty liver     Hyperlipidemia Off and one sincde about .    Obesity       Family History   Problem Relation Age of Onset    Cancer Mother     Cancer Father     Colon polyps Father       Social History     Socioeconomic History    Marital status: Single   Tobacco Use    Smoking status: Former     Current packs/day: 0.00     Average packs/day: 0.5 packs/day for 15.0 years (7.5 ttl pk-yrs)     Types: Cigarettes     Start date: 1999     Quit date: 2014     Years since quittin.2     Passive exposure: Past    Smokeless tobacco: Never    Tobacco comments:     I quit smoking 10 years ago.   Vaping Use    Vaping status: Some Days    Substances: Nicotine    Devices: Disposable   Substance and Sexual Activity    Alcohol use: Yes     Alcohol/week: 24.0 standard drinks of alcohol     Types: 12 Cans of beer, 2 Shots of liquor, 10 Drinks containing 0.5 oz of alcohol per week    Drug use: Not Currently    Sexual activity: Not Currently     Partners: Female     Birth control/protection: Condom     Comment: I also take Sildenefil, not daily.      No Known  Allergies   Immunization History   Administered Date(s) Administered    COVID-19 (PFIZER) 12YRS+ (COMIRNATY) 10/10/2023, 11/05/2024    COVID-19 (PFIZER) BIVALENT 12+YRS 09/29/2022    COVID-19 (PFIZER) Purple Cap Monovalent 03/03/2021, 03/26/2021, 09/06/2021, 10/13/2021    Flu Vaccine Split Quad 10/16/2014    Fluzone (or Fluarix & Flulaval for VFC) >6mos 10/13/2016, 09/26/2017, 09/24/2019, 10/05/2020, 09/06/2023    Fluzone Quad >6mos (Multi-dose) 10/01/2018, 09/30/2020, 10/01/2020, 09/20/2021, 09/01/2022    Hepatitis A 03/21/2019, 09/24/2019    Influenza Seasonal Injectable 09/16/2021    Influenza, Unspecified 10/16/2014, 10/13/2016, 09/26/2017, 09/24/2019, 10/05/2020, 09/16/2021    Pneumococcal Conjugate 20-Valent (PCV20) 10/05/2023    Tdap 03/25/2010, 04/06/2022        PE:  Vitals:    03/14/25 1054   BP: 134/74   Pulse: 90   SpO2: 97%      Body mass index is 36.19 kg/m².    Gen Appearance: NAD  HEENT: Normocephalic, PERRLA, no thyromegaly, trache midline  Heart: RRR, normal S1 and S2, no murmur  Lungs: CTA b/l, no wheezing, no crackles  Abdomen: Soft, non-tender, non-distended, no guarding and BSx4  MSK: Moves all extremities well, normal gait, no peripheral edema  Pulses: Palpable and equal b/l  Lymph nodes: No palpable lymphadenopathy   Neuro: No focal deficits      Current Outpatient Medications   Medication Sig Dispense Refill    Retin-A 0.025 % cream       ziprasidone (GEODON) 20 MG capsule Take 1 capsule by mouth 2 (Two) Times a Day.      fluticasone (FLONASE) 50 MCG/ACT nasal spray INSTILL 2 SPRAYS INTO THE NOSTRILS AS DIRECTED BY PROVIDER DAILY 16 g 5    Sildenafil Citrate 10 MG/ML reconstituted suspension        No current facility-administered medications for this visit.        Diagnoses and all orders for this visit:    1. Preventative health care (Primary)  -     Comprehensive Metabolic Panel; Future  -     CBC & Differential; Future  -     Hemoglobin A1c; Future  -     Lipid Panel; Future  -      TSH+Free T4; Future  -     Urinalysis With Culture If Indicated - Urine, Clean Catch; Future  Counseled on healthy weight, nutrition, physical activity, cancer screening, and immunizations.    2. Encounter for prostate cancer screening  -     PSA Screen; Future    3. Vitamin D deficiency  -     Vitamin D,25-Hydroxy; Future    4. Colon cancer screening         No follow-ups on file.     Dictated Utilizing Dragon Dictation    Please note that portions of this note were completed with a voice recognition program.    Part of this note may be an electronic transcription/translation of spoken language to printed text using the Dragon Dictation System.